# Patient Record
Sex: MALE | Race: WHITE | NOT HISPANIC OR LATINO | Employment: FULL TIME | URBAN - METROPOLITAN AREA
[De-identification: names, ages, dates, MRNs, and addresses within clinical notes are randomized per-mention and may not be internally consistent; named-entity substitution may affect disease eponyms.]

---

## 2017-01-12 ENCOUNTER — OFFICE VISIT (OUTPATIENT)
Dept: FAMILY MEDICINE | Facility: CLINIC | Age: 32
End: 2017-01-12
Payer: OTHER MISCELLANEOUS

## 2017-01-12 ENCOUNTER — HOSPITAL ENCOUNTER (EMERGENCY)
Facility: CLINIC | Age: 32
Discharge: HOME | End: 2017-01-12
Attending: FAMILY MEDICINE | Admitting: FAMILY MEDICINE
Payer: OTHER MISCELLANEOUS

## 2017-01-12 ENCOUNTER — APPOINTMENT (OUTPATIENT)
Dept: GENERAL RADIOLOGY | Facility: CLINIC | Age: 32
End: 2017-01-12
Attending: EMERGENCY MEDICINE
Payer: OTHER MISCELLANEOUS

## 2017-01-12 VITALS
DIASTOLIC BLOOD PRESSURE: 78 MMHG | BODY MASS INDEX: 26.28 KG/M2 | OXYGEN SATURATION: 100 % | HEART RATE: 53 BPM | SYSTOLIC BLOOD PRESSURE: 118 MMHG | WEIGHT: 194 LBS | HEIGHT: 72 IN

## 2017-01-12 VITALS — DIASTOLIC BLOOD PRESSURE: 82 MMHG | TEMPERATURE: 98.1 F | SYSTOLIC BLOOD PRESSURE: 112 MMHG | OXYGEN SATURATION: 98 %

## 2017-01-12 DIAGNOSIS — S61.219A FINGER LACERATION, INITIAL ENCOUNTER: Primary | ICD-10-CM

## 2017-01-12 DIAGNOSIS — S61.219A FINGER LACERATION, INITIAL ENCOUNTER: ICD-10-CM

## 2017-01-12 PROCEDURE — 73140 X-RAY EXAM OF FINGER(S): CPT | Mod: LT

## 2017-01-12 PROCEDURE — 99213 OFFICE O/P EST LOW 20 MIN: CPT | Performed by: PHYSICIAN ASSISTANT

## 2017-01-12 PROCEDURE — 99283 EMERGENCY DEPT VISIT LOW MDM: CPT | Mod: 25 | Performed by: FAMILY MEDICINE

## 2017-01-12 PROCEDURE — 99283 EMERGENCY DEPT VISIT LOW MDM: CPT | Mod: 25

## 2017-01-12 PROCEDURE — 12041 INTMD RPR N-HF/GENIT 2.5CM/<: CPT | Performed by: FAMILY MEDICINE

## 2017-01-12 PROCEDURE — 12041 INTMD RPR N-HF/GENIT 2.5CM/<: CPT

## 2017-01-12 RX ORDER — TRAMADOL HYDROCHLORIDE 50 MG/1
50-100 TABLET ORAL EVERY 6 HOURS PRN
Qty: 10 TABLET | Refills: 0 | Status: SHIPPED | OUTPATIENT
Start: 2017-01-12 | End: 2020-12-16

## 2017-01-12 RX ORDER — CEPHALEXIN 500 MG/1
500 CAPSULE ORAL 3 TIMES DAILY
Qty: 9 CAPSULE | Refills: 0 | Status: SHIPPED | OUTPATIENT
Start: 2017-01-12 | End: 2017-01-15

## 2017-01-12 NOTE — PROGRESS NOTES
SUBJECTIVE:                                                    Robert Shoemaker is a 31 year old male who presents to clinic today for the following health issues:    Laceration      Duration: within an hour    Description (location/character/radiation): left pointer finger - was cut on a steel std at work     Intensity: moderate/severe    Accompanying signs and symptoms: still actively bleeding    History (similar episodes/previous evaluation): None    Precipitating or alleviating factors: None    Therapies tried and outcome: None   No numbness/tingling     Problem list and histories reviewed & adjusted, as indicated.  Additional history: as documented    There is no problem list on file for this patient.    History reviewed. No pertinent past surgical history.    Social History   Substance Use Topics     Smoking status: Current Every Day Smoker     Smokeless tobacco: Not on file     Alcohol Use: Yes     Family History   Problem Relation Age of Onset     Hypertension Mother          No current outpatient prescriptions on file.     No Known Allergies  Problem list, Medication list, Allergies, and Medical/Social/Surgical histories reviewed in EPIC and updated as appropriate.    ROS:  Constitutional, HEENT, cardiovascular, pulmonary, gi and gu systems are negative, except as otherwise noted.    OBJECTIVE:                                                    /78 mmHg  Pulse 53  Ht 6' (1.829 m)  Wt 194 lb (87.998 kg)  BMI 26.31 kg/m2  SpO2 100%  Body mass index is 26.31 kg/(m^2).  GENERAL: healthy, alert and no distress  Left Index finger: U - flap shape laceration over proximal inter-phalangeal joint. Appears to be in joint. Hard to tell if tendon involved. Pain with resisted finger extension. 5/5 strength. Neurovascularly Intact Distally. .     ASSESSMENT/PLAN:                                                        ICD-10-CM    1. Finger laceration, initial encounter S61.219A       Lidocaine was used prior to  exploring wound. This is too deep and into the joint for me to repair.   Tube gauze dressing applied.elevate.  Advised follow up  In ED.     Mckay Roman PA-C  [facility]

## 2017-01-12 NOTE — ED AVS SNAPSHOT
[facility] Emergency Department    [address] [address]    Phone:  [phone]    Fax:  [phone]                                       Robert Shoemaker   MRN: [id number]    Department:  [facility] Emergency Department   Date of Visit:  1/12/2017           Patient Information     Date Of Birth          1985        Your diagnoses for this visit were:     Finger laceration, initial encounter        You were seen by Lemuel Davey MD.        Discharge Instructions       Return to the Emergency Room if the following occurs:     Worsened pain, expanding redness and swelling, fever, or for any concern at anytime.    Or, follow-up with the following provider as we discussed:     Return to [facility] next week for reevaluation.  Call [phone] for reevaluation.    Medications discussed:    Antibiotic ointment twice daily until stitches are removed.  Keflex.  Ibuprofen 600mg every 6 hours for pain (7 days duration).  Tylenol 1000mg every 6 hours for pain (7 days duration).  Splint on over the next 3 days.    If you received pain-relieving or sedating medication during your time in the ER, avoid alcohol, driving automobiles, or working with machinery.  Also, a responsible adult must stay with you.      If you had X-rays or labs done we will attempt to contact you if there is a change needed in your care.      Call the Nurse Advice Line at [phone] or [phone] for any concern at anytime.      24 Hour Appointment Hotline       To make an appointment at any [place] clinic, call [phone] ([phone]). If you don't have a family doctor or clinic, we will help you find one. [place] clinics are conveniently located to serve the needs of you and your family.             Review of your medicines      START taking        Dose / Directions Last dose taken    cephALEXin 500 MG capsule   Commonly known as:  KEFLEX   Dose:  500 mg   Quantity:  9 capsule         "Take 1 capsule (500 mg) by mouth 3 times daily for 3 days   Refills:  0                Prescriptions were sent or printed at these locations (1 Prescription)                   [facility] [place] - [address]   [address] [address]    Telephone:  [phone]   Fax:  [phone]   Hours:                  E-Prescribed (1 of 1)         cephALEXin (KEFLEX) 500 MG capsule                Procedures and tests performed during your visit     Fingers XR, 2-3 views, left      Orders Needing Specimen Collection     None      Pending Results     No orders found from 1/11/2017 to 1/13/2017.            Pending Culture Results     No orders found from 1/11/2017 to 1/13/2017.       Test Results from your hospital stay           1/12/2017  4:08 PM - Interface, Radiant Ib      Narrative     XR FINGER LT G/E 2 VW   1/12/2017 3:56 PM     HISTORY: pain    COMPARISON: None.        Impression     IMPRESSION: No evidence of acute fracture or subluxation. Bandage  material overlies the index finger. On the lateral view there is a  radiopacity dorsal and proximal to the PIP joint may be something  overlying the patient or a small foreign body.    WAYNE BARBOZA MD                Thank you for choosing [facility]       Thank you for choosing [facility] for your care. Our goal is always to provide you with excellent care. Hearing back from our patients is one way we can continue to improve our services. Please take a few minutes to complete the written survey that you may receive in the mail after you visit with us. Thank you!        [facility] Information     [employer] lets you send messages to your doctor, view your test results, renew your prescriptions, schedule appointments and more. To sign up, go to [email or web address] . Click on \"Log in\" on the left side of the screen, which will take you to the Welcome page. Then click on \"Sign up Now\" on the right side of the page.     You will be asked to " enter the access code listed below, as well as some personal information. Please follow the directions to create your username and password.     Your access code is: [email or web address]  Expires: 2017  2:44 PM     Your access code will  in 90 days. If you need help or a new code, please call your [place] clinic or [phone].        Care EveryWhere ID     This is your Care EveryWhere ID. This could be used by other organizations to access your [place] medical records  [id number]        After Visit Summary       This is your record. Keep this with you and show to your community pharmacist(s) and doctor(s) at your next visit.

## 2017-01-12 NOTE — ED AVS SNAPSHOT
[facility] Emergency Department    [address] [address]    Phone:  [phone]    Fax:  [phone]                                       Robert Shoemaker   MRN: [id number]    Department:  [facility] Emergency Department   Date of Visit:  1/12/2017           After Visit Summary Signature Page     I have received my discharge instructions, and my questions have been answered. I have discussed any challenges I see with this plan with the nurse or doctor.    ..........................................................................................................................................  Patient/Patient Representative Signature      ..........................................................................................................................................  Patient Representative Print Name and Relationship to Patient    ..................................................               ................................................  Date                                            Time    ..........................................................................................................................................  Reviewed by Signature/Title    ...................................................              ..............................................  Date                                                            Time

## 2017-01-12 NOTE — MR AVS SNAPSHOT
"              After Visit Summary   2017    Robert Shoemaker    MRN: [id number]           Patient Information     Date Of Birth          1985        Visit Information        Provider Department      2017 1:50 PM Mckay Roman PA-C [facility] [place]         Follow-ups after your visit        Who to contact     If you have questions or need follow up information about today's clinic visit or your schedule please contact [facility] directly at [phone].  Normal or non-critical lab and imaging results will be communicated to you by MyChart, letter or phone within 4 business days after the clinic has received the results. If you do not hear from us within 7 days, please contact the clinic through [facility] or phone. If you have a critical or abnormal lab result, we will notify you by phone as soon as possible.  Submit refill requests through [facility] or call your pharmacy and they will forward the refill request to us. Please allow 3 business days for your refill to be completed.          Additional Information About Your Visit        [facility] Information     [facility] lets you send messages to your doctor, view your test results, renew your prescriptions, schedule appointments and more. To sign up, go to [email or web address] . Click on \"Log in\" on the left side of the screen, which will take you to the Welcome page. Then click on \"Sign up Now\" on the right side of the page.     You will be asked to enter the access code listed below, as well as some personal information. Please follow the directions to create your username and password.     Your access code is: [email or web address]  Expires: 2017  2:44 PM     Your access code will  in 90 days. If you need help or a new code, please call your [facility] or [phone].        Care EveryWhere ID     This is your Care EveryWhere ID. This could be used by other organizations to access your [place] medical " records  [id number]        Your Vitals Were     Pulse Height BMI (Body Mass Index) Pulse Oximetry          53 6' (1.829 m) 26.31 kg/m2 100%         Blood Pressure from Last 3 Encounters:   01/12/17 118/78    Weight from Last 3 Encounters:   01/12/17 194 lb (87.998 kg)              Today, you had the following     No orders found for display       Primary Care Provider Office Phone # Fax #    [facility] [phone] [address] [facility] [address]        Thank you!     Thank you for choosing [facility]  for your care. Our goal is always to provide you with excellent care. Hearing back from our patients is one way we can continue to improve our services. Please take a few minutes to complete the written survey that you may receive in the mail after your visit with us. Thank you!             Your Updated Medication List - Protect others around you: Learn how to safely use, store and throw away your medicines at www.disposemymeds.org.      Notice  As of 1/12/2017  2:44 PM    You have not been prescribed any medications.

## 2017-01-13 NOTE — ED NOTES
Patient seen his doctor today and was told to come to the ER for further evaluation of the laceration on his finger.

## 2017-01-13 NOTE — DISCHARGE INSTRUCTIONS
Return to the Emergency Room if the following occurs:     Worsened pain, expanding redness and swelling, fever, or for any concern at anytime.    Or, follow-up with the following provider as we discussed:     Return to [facility] next week for reevaluation.  Call [phone] for reevaluation.    Medications discussed:    Antibiotic ointment twice daily until stitches are removed.  Keflex.  Ibuprofen 600mg every 6 hours for pain (7 days duration).  Tylenol 1000mg every 6 hours for pain (7 days duration).  Splint on over the next 3 days.    If you received pain-relieving or sedating medication during your time in the ER, avoid alcohol, driving automobiles, or working with machinery.  Also, a responsible adult must stay with you.      If you had X-rays or labs done we will attempt to contact you if there is a change needed in your care.      Call the Nurse Advice Line at [phone] or [phone] for any concern at anytime.

## 2017-01-13 NOTE — ED PROVIDER NOTES
HPI  Patient is a [age]-year-old male presenting with laceration to his left second finger.  No prior injury to this finger is reported.  This occurred just prior to arrival.  This was work related.  He was seen in a clinic before coming here.  He tells me there was some local anesthesia given no imaging performed.  They were concerned about a tendon injury and suggested the patient come here for further evaluation.  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  He denies loss of strength with extension or flexion.  No numbness described.  No other injury.    ROS: All other review of systems are negative other than that noted above.    PMH: Reviewed.  SH: Reviewed.  FH: Reviewed.      PHYSICAL  /75 mmHg  Temp(Src) 98.1  F (36.7  C) (Oral)  SpO2 98%  General: Patient is alert and in moderate distress.  Neurological: Alert.  Moving upper and lower extremities equally, bilaterally.  Head / Neck: Atraumatic.  Ears: Not done.  Eyes: Pupils are equal, round, and reactive.  Normal conjunctiva.  Nose: Midline.  No epistaxis.  Mouth / Throat:  Moist. Respiratory: No respiratory distress.  Cardiovascular: Regular rhythm.  Peripheral extremities are warm.    Abdomen / Pelvis: Not done. Genitalia: Not done.  Musculoskeletal: Nontender to palpation over major muscles and joints.  Skin: There is a 2.5 centimeter curvilinear laceration involving his second knuckle, overlying the PIP joint.  There is active bleeding.  It extends about skilled nursing into the soft tissue overlying the knuckle.  There is a small oval disc that is firm and attached to soft tissue.  I think this may be a portion of the tendon sheath.  He has normal strength with extension and flexion.  I cannot identify an obvious tendon injury.      PHYSICIAN  1820.  Patient agrees to local anesthesia for exploration.  My exam is documented above.  Imaging is completed and shows a potential foreign body, as described  by radiology.  I'm unable to identify a foreign body on exam.  The wound will be flushed with 500 mL normal saline.    IMAGING  XR finger.  Images reviewed by me.  Radiology report was also reviewed.      1952.  Upon further examination, I am able to see that the tendon sheath was compromised by the laceration.  The disc material is likely a sami of bone as it is attached to small part of the tendon sheath.  I am able to see the extensor tendon but not in its entirety.  It is moving with active extension.  The overlying laceration is closed.  I did not place a suture within the tendon sheath.    PROCEDURE  Laceration closure.  Local anesthesia with lidocaine and epinephrine.  Wound irrigation with 500 mL normal saline.  Exploration, as above.  I then sutured the wound closed using #6 simple interrupted stitches, 4-0 Ethilon suture.      IMPRESSION    ICD-10-CM    1. Finger laceration, initial encounter S61.219A              Lemuel Davey MD  01/12/17 1954

## 2020-12-16 ENCOUNTER — OFFICE VISIT (OUTPATIENT)
Dept: DERMATOLOGY | Facility: CLINIC | Age: 35
End: 2020-12-16
Payer: COMMERCIAL

## 2020-12-16 DIAGNOSIS — L40.0 PLAQUE PSORIASIS: Primary | ICD-10-CM

## 2020-12-16 DIAGNOSIS — Z20.822 EXPOSURE TO COVID-19 VIRUS: ICD-10-CM

## 2020-12-16 DIAGNOSIS — L40.0 PLAQUE PSORIASIS: ICD-10-CM

## 2020-12-16 LAB
ALBUMIN SERPL-MCNC: 4.1 G/DL (ref 3.4–5)
ALP SERPL-CCNC: 100 U/L (ref 40–150)
ALT SERPL W P-5'-P-CCNC: 56 U/L (ref 0–70)
ANION GAP SERPL CALCULATED.3IONS-SCNC: 6 MMOL/L (ref 3–14)
AST SERPL W P-5'-P-CCNC: 36 U/L (ref 0–45)
BASOPHILS # BLD AUTO: 0 10E9/L (ref 0–0.2)
BASOPHILS NFR BLD AUTO: 0.6 %
BILIRUB SERPL-MCNC: 0.5 MG/DL (ref 0.2–1.3)
BUN SERPL-MCNC: 15 MG/DL (ref 7–30)
CALCIUM SERPL-MCNC: 9.1 MG/DL (ref 8.5–10.1)
CHLORIDE SERPL-SCNC: 107 MMOL/L (ref 94–109)
CO2 SERPL-SCNC: 27 MMOL/L (ref 20–32)
CREAT SERPL-MCNC: 0.94 MG/DL (ref 0.66–1.25)
DIFFERENTIAL METHOD BLD: NORMAL
EOSINOPHIL # BLD AUTO: 0.1 10E9/L (ref 0–0.7)
EOSINOPHIL NFR BLD AUTO: 1.3 %
ERYTHROCYTE [DISTWIDTH] IN BLOOD BY AUTOMATED COUNT: 11.6 % (ref 10–15)
GFR SERPL CREATININE-BSD FRML MDRD: >90 ML/MIN/{1.73_M2}
GLUCOSE SERPL-MCNC: 98 MG/DL (ref 70–99)
HCT VFR BLD AUTO: 47.1 % (ref 40–53)
HGB BLD-MCNC: 16.2 G/DL (ref 13.3–17.7)
IMM GRANULOCYTES # BLD: 0.2 10E9/L (ref 0–0.4)
IMM GRANULOCYTES NFR BLD: 2.3 %
LYMPHOCYTES # BLD AUTO: 1.7 10E9/L (ref 0.8–5.3)
LYMPHOCYTES NFR BLD AUTO: 23.7 %
MCH RBC QN AUTO: 31.6 PG (ref 26.5–33)
MCHC RBC AUTO-ENTMCNC: 34.4 G/DL (ref 31.5–36.5)
MCV RBC AUTO: 92 FL (ref 78–100)
MONOCYTES # BLD AUTO: 0.7 10E9/L (ref 0–1.3)
MONOCYTES NFR BLD AUTO: 10.3 %
NEUTROPHILS # BLD AUTO: 4.3 10E9/L (ref 1.6–8.3)
NEUTROPHILS NFR BLD AUTO: 61.8 %
NRBC # BLD AUTO: 0 10*3/UL
NRBC BLD AUTO-RTO: 0 /100
PLATELET # BLD AUTO: 180 10E9/L (ref 150–450)
POTASSIUM SERPL-SCNC: 4.3 MMOL/L (ref 3.4–5.3)
PROT SERPL-MCNC: 8.3 G/DL (ref 6.8–8.8)
RBC # BLD AUTO: 5.12 10E12/L (ref 4.4–5.9)
SODIUM SERPL-SCNC: 140 MMOL/L (ref 133–144)
WBC # BLD AUTO: 7 10E9/L (ref 4–11)

## 2020-12-16 PROCEDURE — 86803 HEPATITIS C AB TEST: CPT | Mod: 90 | Performed by: PATHOLOGY

## 2020-12-16 PROCEDURE — 80053 COMPREHEN METABOLIC PANEL: CPT | Performed by: PATHOLOGY

## 2020-12-16 PROCEDURE — 86769 SARS-COV-2 COVID-19 ANTIBODY: CPT | Performed by: PATHOLOGY

## 2020-12-16 PROCEDURE — 87340 HEPATITIS B SURFACE AG IA: CPT | Mod: 90 | Performed by: PATHOLOGY

## 2020-12-16 PROCEDURE — 86481 TB AG RESPONSE T-CELL SUSP: CPT | Mod: 90 | Performed by: PATHOLOGY

## 2020-12-16 PROCEDURE — 36415 COLL VENOUS BLD VENIPUNCTURE: CPT | Performed by: PATHOLOGY

## 2020-12-16 PROCEDURE — 99203 OFFICE O/P NEW LOW 30 MIN: CPT | Performed by: PHYSICIAN ASSISTANT

## 2020-12-16 PROCEDURE — 86706 HEP B SURFACE ANTIBODY: CPT | Mod: 90 | Performed by: PATHOLOGY

## 2020-12-16 PROCEDURE — 86704 HEP B CORE ANTIBODY TOTAL: CPT | Mod: 90 | Performed by: PATHOLOGY

## 2020-12-16 PROCEDURE — 85025 COMPLETE CBC W/AUTO DIFF WBC: CPT | Performed by: PATHOLOGY

## 2020-12-16 PROCEDURE — 87389 HIV-1 AG W/HIV-1&-2 AB AG IA: CPT | Mod: 90 | Performed by: PATHOLOGY

## 2020-12-16 ASSESSMENT — PAIN SCALES - GENERAL: PAINLEVEL: MODERATE PAIN (4)

## 2020-12-16 NOTE — LETTER
12/16/2020       RE: Robert Shoemaker  [address] [address]     Dear Colleague,    Thank you for referring your patient, Robert Shoemaker, to the [facility] [place] [facility]. Please see a copy of my visit note below.    [facility] Dermatology Note      Dermatology Problem List:  1.Plaque psoriasis in a patient with ulcerative colitis  Start Stelara 45 mg week zero, 4 and every 12 weeks.   Punch biopsy 9/23/19 consistent with psoriasis       CC:   Chief Complaint   Patient presents with     Derm Problem     Patient is here today for psoriasis.          Encounter Date: Dec 16, 2020    History of Present Illness:  Mr. Robert Shoemaker is a 35 year old male who presents in self referral for psoriasis. He is here as a new patient. He was seen by Dr Nettles 12/2019 and attempted to start on Stelara but never received the medication. He first noticed his psoriasis the summer of 2019 and got one spot on his abdomen. Gradually it spread to his face, scalp, elbows and knees and behind his ears. It affects his genitalia and his butt. Within the last 2 months he has noticed it spreading on to his fingers. It is very bothersome.     He started a [employer] company 3 years ago and admits it is very stressful work.     He has history of Ulcerative colitis, diagnosed with colonoscopy and biopsy. He had been followed at a private GI speciality clinic, he believes [facility] but has not gone back to them for several years. He typically has regular abdominal pain and diarrhea at least 3 times per day. He went on steroids in the past and it cleared.     He is interested in a medication that may treat both his UC and his plaque psoriasis. He denies any joint pain at this time or nail changes.     He lives with and helps take care of his elderly grandfather. He states his grandfather was hospitalize twice for COVID complications within the  last month. Robert wonders if he ever was positive as he never had any symptoms of COVID -19.     HE is feeling well, otherwise, without other skin concerns.     Past Medical History:   There is no problem list on file for this patient.    No past medical history on file.  No past surgical history on file.    Social History:  Patient reports that he has quit smoking. He uses smokeless tobacco. He reports current alcohol use. He reports that he does not use drugs. is single. Cares for his grandfather.   Family History:  Family History   Problem Relation Age of Onset     Hypertension Mother    No family history of skin diseases.     Medications:  Current Outpatient Medications   Medication Sig Dispense Refill     traMADol (ULTRAM) 50 MG tablet Take 1-2 tablets ( mg) by mouth every 6 hours as needed for pain (Patient not taking: Reported on 12/16/2020) 10 tablet 0     No Known Allergies      Review of Systems:  -Skin/Heme New Pt: The patient admits to frequent sun exposure. The patient denies excessive scarring or problems healing except as per HPI. The patient denies excessive bleeding.  -Constitutional: Otherwise feeling well today, in usual state of health.  -Skin: As above in HPI. No additional skin concerns.  -ROS: see HPI, + diarrhea+ abdominal pain. Denies arthalgias, myalgias, fever, cough, congestion, loss of taste or smell.     Physical exam:  Vitals: There were no vitals taken for this visit.  GEN: This is a well developed, well-nourished male in no acute distress, in a pleasant mood.    SKIN: Full skin, which includes the head/face, both arms, chest, back, abdomen,both legs, genitalia and/or groin buttocks, digits and/or nails, was examined.  -[place]a skin type: II  -There are erythematous well demarcated plaques with silvery micaceous scale on the scalp, post auricular areas, face, dorsal hands, intergluteal cleft and large plaques on the elbows, knees and abdomen.   -No other lesions of concern on  areas examined.     Labs:  CBC with diff, CMP, Hep C antibody, Hep B core and Surface antibody, Hepatitis B antigen, HIV, Tuberculosis Quantiferon.  Covid Antibodies    Impression/Plan:  1. Plaque Psoriasis, in a patient with concomitant ulcerative colitis. Will want to use a systemic mediation that will treat both diseases. (TNF inhibitor Humira or Remicade are other options) at least 10 % BSA with most of scalp affected and his genitals.   -Discussed biologics,  He has been on topical medications for many years.. It has become more wide spread and involving the genitals and nails and scalp.   Will order CBC with diff, CMP, Hep C antibody, Hep B core and Surface antibody, Hepatitis B antigen, HIV, Tuberculosis Quantiferon.  If all within normal limits, we will start Stelara 45 mg at week zero, 4 and then every 12 weeks.   Discussed risks and benefits of the medication. Pt understands that he is at an increased risk for infections and malignancies such as lymphoma. Will notify the office if any change in health.     2. Exposure to Covid in a person in his home,   -Will order COVID antibodies.      CC Referred Self, MD  No address on file on close of this encounter.  Follow-up in 3 months, earlier for new or changing lesions.       Staff Involved:  Staff Only    All risks, benefits and alternatives were discussed with patient.  Patient is in agreement and understands the assessment and plan.  All questions were answered.  Sun Screen Education was given.   Return to Clinic in 3 months or sooner as needed.   Estephania Rocha PA-C

## 2020-12-16 NOTE — NURSING NOTE
Chief Complaint   Patient presents with     Derm Problem     Patient is here today for psoriasis.      Charmaine LA CMA

## 2020-12-16 NOTE — PROGRESS NOTES
[facility] Dermatology Note      Dermatology Problem List:  1.Plaque psoriasis in a patient with ulcerative colitis  Start Stelara 45 mg week zero, 4 and every 12 weeks.   Punch biopsy 9/23/19 consistent with psoriasis       CC:   Chief Complaint   Patient presents with     Derm Problem     Patient is here today for psoriasis.          Encounter Date: Dec 16, 2020    History of Present Illness:  Mr. Robert Shoemaker is a 35 year old male who presents in self referral for psoriasis. He is here as a new patient. He was seen by Dr Nettles 12/2019 and attempted to start on Stelara but never received the medication. He first noticed his psoriasis the summer of 2019 and got one spot on his abdomen. Gradually it spread to his face, scalp, elbows and knees and behind his ears. It affects his genitalia and his butt. Within the last 2 months he has noticed it spreading on to his fingers. It is very bothersome.     He started a [employer] company 3 years ago and admits it is very stressful work.     He has history of Ulcerative colitis, diagnosed with colonoscopy and biopsy. He had been followed at a private GI speciality clinic, he believes [facility] but has not gone back to them for several years. He typically has regular abdominal pain and diarrhea at least 3 times per day. He went on steroids in the past and it cleared.     He is interested in a medication that may treat both his UC and his plaque psoriasis. He denies any joint pain at this time or nail changes.     He lives with and helps take care of his elderly grandfather. He states his grandfather was hospitalize twice for COVID complications within the last month. Robert wonders if he ever was positive as he never had any symptoms of COVID -19.     HE is feeling well, otherwise, without other skin concerns.     Past Medical History:   There is no problem list on file for this patient.    No past medical history on file.  No past surgical history on  file.    Social History:  Patient reports that he has quit smoking. He uses smokeless tobacco. He reports current alcohol use. He reports that he does not use drugs. is single. Cares for his grandfather.   Family History:  Family History   Problem Relation Age of Onset     Hypertension Mother    No family history of skin diseases.     Medications:  Current Outpatient Medications   Medication Sig Dispense Refill     traMADol (ULTRAM) 50 MG tablet Take 1-2 tablets ( mg) by mouth every 6 hours as needed for pain (Patient not taking: Reported on 12/16/2020) 10 tablet 0     No Known Allergies      Review of Systems:  -Skin/Heme New Pt: The patient admits to frequent sun exposure. The patient denies excessive scarring or problems healing except as per HPI. The patient denies excessive bleeding.  -Constitutional: Otherwise feeling well today, in usual state of health.  -Skin: As above in HPI. No additional skin concerns.  -ROS: see HPI, + diarrhea+ abdominal pain. Denies arthalgias, myalgias, fever, cough, congestion, loss of taste or smell.     Physical exam:  Vitals: There were no vitals taken for this visit.  GEN: This is a well developed, well-nourished male in no acute distress, in a pleasant mood.    SKIN: Full skin, which includes the head/face, both arms, chest, back, abdomen,both legs, genitalia and/or groin buttocks, digits and/or nails, was examined.  -Olvera skin type: II  -There are erythematous well demarcated plaques with silvery micaceous scale on the scalp, post auricular areas, face, dorsal hands, intergluteal cleft and large plaques on the elbows, knees and abdomen.   -No other lesions of concern on areas examined.     Labs:  CBC with diff, CMP, Hep C antibody, Hep B core and Surface antibody, Hepatitis B antigen, HIV, Tuberculosis Quantiferon.  Covid Antibodies    Impression/Plan:  1. Plaque Psoriasis, in a patient with concomitant ulcerative colitis. Will want to use a systemic mediation  that will treat both diseases. (TNF inhibitor Humira or Remicade are other options) at least 10 % BSA with most of scalp affected and his genitals.   -Discussed biologics,  He has been on topical medications for many years.. It has become more wide spread and involving the genitals and nails and scalp.   Will order CBC with diff, CMP, Hep C antibody, Hep B core and Surface antibody, Hepatitis B antigen, HIV, Tuberculosis Quantiferon.  If all within normal limits, we will start Stelara 45 mg at week zero, 4 and then every 12 weeks.   Discussed risks and benefits of the medication. Pt understands that he is at an increased risk for infections and malignancies such as lymphoma. Will notify the office if any change in health.     2. Exposure to Covid in a person in his home,   -Will order COVID antibodies.      CC Referred Self, MD  No address on file on close of this encounter.  Follow-up in 3 months, earlier for new or changing lesions.       Staff Involved:  Staff Only    All risks, benefits and alternatives were discussed with patient.  Patient is in agreement and understands the assessment and plan.  All questions were answered.  Sun Screen Education was given.   Return to Clinic in 3 months or sooner as needed.   Estephania Rocha PA-C

## 2020-12-17 LAB
HBV CORE AB SERPL QL IA: NONREACTIVE
HBV SURFACE AB SERPL IA-ACNC: 0.21 M[IU]/ML
HBV SURFACE AG SERPL QL IA: NONREACTIVE
HCV AB SERPL QL IA: NONREACTIVE
HIV 1+2 AB+HIV1 P24 AG SERPL QL IA: NONREACTIVE

## 2020-12-18 LAB
COVID-19 SPIKE RBD ABY TITER: NORMAL
COVID-19 SPIKE RBD ABY: POSITIVE

## 2020-12-20 ENCOUNTER — HEALTH MAINTENANCE LETTER (OUTPATIENT)
Age: 35
End: 2020-12-20

## 2020-12-21 LAB
GAMMA INTERFERON BACKGROUND BLD IA-ACNC: 0.09 IU/ML
M TB IFN-G CD4+ BCKGRND COR BLD-ACNC: 9.91 IU/ML
M TB TUBERC IFN-G BLD QL: NEGATIVE
MITOGEN IGNF BCKGRD COR BLD-ACNC: 0 IU/ML
MITOGEN IGNF BCKGRD COR BLD-ACNC: 0.02 IU/ML

## 2020-12-22 ENCOUNTER — TELEPHONE (OUTPATIENT)
Dept: DERMATOLOGY | Facility: CLINIC | Age: 35
End: 2020-12-22

## 2020-12-22 DIAGNOSIS — L40.0 PLAQUE PSORIASIS: Primary | ICD-10-CM

## 2020-12-22 RX ORDER — USTEKINUMAB 45 MG/.5ML
45 INJECTION, SOLUTION SUBCUTANEOUS ONCE
Qty: 0.5 ML | Refills: 0 | Status: SHIPPED | OUTPATIENT
Start: 2020-12-22 | End: 2020-12-22

## 2020-12-22 NOTE — TELEPHONE ENCOUNTER
PA Initiation    Medication: Stelara-Pending  Insurance Company: Other (see comments)Comment:  [facility] [phone]  Pharmacy Filling the Rx: [facility] - [address] [place] [address]  Filling Pharmacy Phone:    Filling Pharmacy Fax:    Start Date: 12/22/2020      PA for loading dose and maintenance dose initiated via phone, plan will fax 2 PA forms to complete, one of loading dose and one for maintenance dose.

## 2020-12-29 NOTE — TELEPHONE ENCOUNTER
PRIOR AUTHORIZATION DENIED    Medication: Stelara-Denied    Denial Date: 12/24/2020    Denial Rational:     Appeal Information:

## 2021-01-15 ENCOUNTER — MYC MEDICAL ADVICE (OUTPATIENT)
Dept: DERMATOLOGY | Facility: CLINIC | Age: 36
End: 2021-01-15

## 2021-01-18 NOTE — TELEPHONE ENCOUNTER
AppeaL:    Mr Shoemaker has significant plaque psoriasis affecting the face ,trunk, extremities, genitlas and most of the scalp. He has concurrent ulcerative colitis that needs treatment. He needs a systemic treatment to treat both diseases. Phototherapy would not be effective for the scalp.  He defers topical steroids as he has used these in the past without improvement. The psoriasis has become too widespread to use topical medications. Please consider covering this medication or Humira has this also treats Ulcerative colitis.     Thank you for your consideration,  Estephania Rocha PA-C

## 2021-01-22 NOTE — TELEPHONE ENCOUNTER
Medication Appeal Initiation    We have initiated an appeal for the requested medication:  Medication: Stelara - Appeal Pending  Appeal Start Date:  1/22/2021  Insurance Company:    Comments:   Faxed appeal to the insurance.

## 2021-02-08 NOTE — TELEPHONE ENCOUNTER
MEDICATION APPEAL DENIED    Medication: Stelara - Appeal Denied    Denial Date:   02/08/2021    Denial Rational: Partial denial - 0.5ml for 1 fill then 0.5ml every 84 days for 5 months    Second Level Appeal Information: This is a partial denial.

## 2021-02-11 NOTE — TELEPHONE ENCOUNTER
Tiago Yepez-  Do I need to re-send the script anywhere or is he able to get it delivered? I guess partial is not bad! Thanks for the help!  Estephania Rocha PA-C

## 2021-02-12 NOTE — TELEPHONE ENCOUNTER
Left a message for patient to reach out to his GI physicians to see if they can prescribe stronger doses of biologics for his ulcerative colitis. For now, will attempt to get humira covered for patient, psoriasis dosing 80 mg x 1 then 40 mg every 2 weeks (CF pens)

## 2021-02-16 ENCOUNTER — TELEPHONE (OUTPATIENT)
Dept: DERMATOLOGY | Facility: CLINIC | Age: 36
End: 2021-02-16

## 2021-02-16 NOTE — TELEPHONE ENCOUNTER
[facility] Call Center    Phone Message    May a detailed message be left on voicemail: yes     Reason for Call: Medication Question or concern regarding medication   Prescription Clarification  Name of Medication: adalimumab (HUMIRA *CF*) 80 MG/0.8ML pen kit  Prescribing Provider: Estephania Rocha   [facility]   What on the order needs clarification? The pharmacist needs to [id number] the dosing instructions. Please call to discuss. Thanks.          Action Taken: Message routed to:  [facility]    Travel Screening: Not Applicable

## 2021-02-16 NOTE — TELEPHONE ENCOUNTER
"adalimumab (HUMIRA *CF*) 80 MG/0.8ML pen kit 0.8 mL 0 2/12/2021  --   Sig - Route: Inject 0.8 mLs (80 mg) Subcutaneous See Admin Instructions for 1 dose - Subcutaneous     Spoke with pharm and had them change it to \"Inject 0.8 mLs (80 mg) Subcutaneous See Instructions in package for the first dose\"    Annetta Guerrero CMA   "

## 2021-02-23 ENCOUNTER — TELEPHONE (OUTPATIENT)
Dept: DERMATOLOGY | Facility: CLINIC | Age: 36
End: 2021-02-23

## 2021-02-23 NOTE — TELEPHONE ENCOUNTER
PA Initiation    Medication: Humira - Pending  Insurance Company:    Pharmacy Filling the Rx:    Filling Pharmacy Phone:    Filling Pharmacy Fax:    Start Date: 2/23/2021

## 2021-02-24 NOTE — TELEPHONE ENCOUNTER
Prior Authorization Approval    Authorization Effective Date: 2/24/2021  Authorization Expiration Date: 3/23/2021  Medication: Humira - APPROVED   Approved Dose/Quantity:  Starter   Reference #:     Insurance Company: Other (see comments)  Expected CoPay:       CoPay Card Available:      Foundation Assistance Needed:    Which Pharmacy is filling the prescription (Not needed for infusion/clinic administered): [facility] [address]  Pharmacy Notified: Yes  Patient Notified: Yes

## 2021-02-25 ENCOUNTER — MYC MEDICAL ADVICE (OUTPATIENT)
Dept: DERMATOLOGY | Facility: CLINIC | Age: 36
End: 2021-02-25

## 2021-03-15 ENCOUNTER — VIRTUAL VISIT (OUTPATIENT)
Dept: DERMATOLOGY | Facility: CLINIC | Age: 36
End: 2021-03-15
Payer: COMMERCIAL

## 2021-03-15 DIAGNOSIS — K51.90 CHRONIC ULCERATIVE COLITIS WITHOUT COMPLICATION, UNSPECIFIED LOCATION (H): Primary | ICD-10-CM

## 2021-03-15 DIAGNOSIS — L40.0 PLAQUE PSORIASIS: ICD-10-CM

## 2021-03-15 PROCEDURE — 99214 OFFICE O/P EST MOD 30 MIN: CPT | Mod: 95 | Performed by: PHYSICIAN ASSISTANT

## 2021-03-15 ASSESSMENT — PAIN SCALES - GENERAL: PAINLEVEL: NO PAIN (0)

## 2021-03-15 NOTE — LETTER
3/15/2021       RE: Robert Shoemaker  [id number] [facility] [address]     Dear Colleague,    Thank you for referring your patient, Robert Shoemaker, to the [facility] [place] at [facility]. Please see a copy of my visit note below.    [facility] Dermatology Note  Encounter Date: Mar 15, 2021  Store-and-Forward and Telephone ([phone]). Location of teledermatologist: [facility] [place].  Start time: 10:51 End time: 10:59 am.    Dermatology Problem List:  1. Plaque psoriasis in a patient with ulcerative colitis  Humira CF, 40 mg every 2 weeks.   Insurance did not cover cover Stelara.  Punch biopsy 9/23/19 consistent with psoriasis     ____________________________________________    Assessment & Plan:     # Plaque Psoriasis,  Improving after recently started Humira CF.  Continue Humira CF 40 mg subcutaneously every 2 weeks.  Reviewed Biologics and potential risk for infections.  Patient to keep up with vaccinations, except live vaccinations, then recommend holding the medication.  -Hold medication if become ill until better.    #2 history of ulcerative colitis, no change with Humira.  A GI referral was sent as patient does not have good follow-up for this.  In the past he was briefly followed at [facility].  He would prefer to stay at the [facility] system.      Procedures Performed:    None    Follow-up: 3 week(s) virtually (telephone with photos), or earlier for new or changing lesions    Staff:     All risks, benefits and alternatives were discussed with patient.  Patient is in agreement and understands the assessment and plan.  All questions were answered.  Sun Screen Education was given.   Return to Clinic in 3 months or sooner as needed.   Estephania Rocha PA-C   ____________________________________________    CC: Derm Problem (robert is having this visit today to discuss  his psoriasis, states that it is clearing up)    HPI:  Mr. Robert Shoemaker is a(n) 35 year old male who presents today as a return patient for plaque psoriasis.  I spoke with him on the phone and reviewed his photos.  He was last seen at the end of December 2020.  I prescribed him Stelara at that time.  This was not covered under his insurance.  Humira was then prescribed.  This process took about 2 months and so he just recently took his first few injections.  He has noticed significant improvement already and feels better.    He denies any change in his ulcerative colitis symptoms and this is still active.    No fevers, arthralgias or numbness.  He has not been ill.    Patient is otherwise feeling well, without additional skin concerns.    Labs Reviewed:  CBC, CMP, M tuberculosis quant.     Physical Exam:  Vitals: There were no vitals taken for this visit.  SKIN: Teledermatology photos were reviewed; image quality and interpretability: Acceptable. Image date: 3/15/21  -Face is now clear.  There are thinner light pink plaques noted on the abdomen and chest.  There is a faint pink scaly plaque on the left knee.  - No other lesions of concern on areas examined.     Medications:  Current Outpatient Medications   Medication     adalimumab (HUMIRA *CF*) 40 MG/0.4ML pen kit     adalimumab (HUMIRA *CF*) 80 MG/0.8ML pen kit     No current facility-administered medications for this visit.       Past Medical/Surgical History:   There is no problem list on file for this patient.    No past medical history on file.    CC Referred Self, MD  No address on file on close of this encounter.

## 2021-03-15 NOTE — NURSING NOTE
Dermatology Rooming Note    Robert Shoemaker's goals for this visit include:   Chief Complaint   Patient presents with     Derm Problem     robert is having this visit today to discuss his psoriasis, states that it is clearing up     Malissa Diamond CMA on 3/15/2021 at 10:44 AM

## 2021-03-15 NOTE — PROGRESS NOTES
[facility] Dermatology Note  Encounter Date: Mar 15, 2021  Store-and-Forward and Telephone ([phone]). Location of teledermatologist: [facility] [place].  Start time: 10:51 End time: 10:59 am.    Dermatology Problem List:  1. Plaque psoriasis in a patient with ulcerative colitis  Humira CF, 40 mg every 2 weeks.   Insurance did not cover cover Stelara.  Punch biopsy 9/23/19 consistent with psoriasis     ____________________________________________    Assessment & Plan:     # Plaque Psoriasis,  Improving after recently started Humira CF.  Continue Humira CF 40 mg subcutaneously every 2 weeks.  Reviewed Biologics and potential risk for infections.  Patient to keep up with vaccinations, except live vaccinations, then recommend holding the medication.  -Hold medication if become ill until better.    #2 history of ulcerative colitis, no change with Humira.  A GI referral was sent as patient does not have good follow-up for this.  In the past he was briefly followed at [facility].  He would prefer to stay at the [facility] system.      Procedures Performed:    None    Follow-up: 3 week(s) virtually (telephone with photos), or earlier for new or changing lesions    Staff:     All risks, benefits and alternatives were discussed with patient.  Patient is in agreement and understands the assessment and plan.  All questions were answered.  Sun Screen Education was given.   Return to Clinic in 3 months or sooner as needed.   Estephania Rocha PA-C   ____________________________________________    CC: Derm Problem (robert is having this visit today to discuss his psoriasis, states that it is clearing up)    HPI:  Mr. Robert Shoemaker is a(n) 35 year old male who presents today as a return patient for plaque psoriasis.  I spoke with him on the phone and reviewed his photos.  He was last seen at the end of December 2020.  I prescribed him Stelara at that time.  This was not  covered under his insurance.  Humira was then prescribed.  This process took about 2 months and so he just recently took his first few injections.  He has noticed significant improvement already and feels better.    He denies any change in his ulcerative colitis symptoms and this is still active.    No fevers, arthralgias or numbness.  He has not been ill.    Patient is otherwise feeling well, without additional skin concerns.    Labs Reviewed:  CBC, CMP, M tuberculosis quant.     Physical Exam:  Vitals: There were no vitals taken for this visit.  SKIN: Teledermatology photos were reviewed; image quality and interpretability: Acceptable. Image date: 3/15/21  -Face is now clear.  There are thinner light pink plaques noted on the abdomen and chest.  There is a faint pink scaly plaque on the left knee.  - No other lesions of concern on areas examined.     Medications:  Current Outpatient Medications   Medication     adalimumab (HUMIRA *CF*) 40 MG/0.4ML pen kit     adalimumab (HUMIRA *CF*) 80 MG/0.8ML pen kit     No current facility-administered medications for this visit.       Past Medical/Surgical History:   There is no problem list on file for this patient.    No past medical history on file.    CC Referred Self, MD  No address on file on close of this encounter.

## 2021-04-12 NOTE — TELEPHONE ENCOUNTER
REFERRAL INFORMATION:    Referring Provider:  Aj    Referring Clinic:  [facility]    Reason for Visit/Diagnosis: Chronic ulcerative colitis     FUTURE VISIT INFORMATION:    Appointment Date: 5.18.21    Appointment Time: 2:20 PM     NOTES STATUS DETAILS   OFFICE NOTE from Referring Provider Internal 3.25.21 VERNON Rocha   OFFICE NOTE from Other Specialist Received  9/15/15 Office visit with Dr. Renee Ramírez ([facility])   HOSPITAL DISCHARGE SUMMARY/  ED VISITS N/A    OPERATIVE REPORT Received Sigmoidoscopy: 5/4/11 ([facility])   MEDICATION LIST Internal         ENDOSCOPY  N/A    COLONOSCOPY N/A    ERCP N/A    EUS N/A    STOOL TESTING N/A    PERTINENT LABS Received/ Internal/ Care Everywhere    PATHOLOGY REPORTS (RELATED) Received  5/4/11 ([facility])    IMAGING (CT, MRI, EGD, MRCP, Small Bowel Follow Through/SBT, MR/CT Enterography) N/A      Action 4.12.21 MJ   Action Taken Called pt to see if medical records are elsewhere. No answer.      5/10/2021 2:55pm Fax request sent to [facility] ([phone]) for med recs. Alex     5/10/2021 3:53pm Received recs from [facility]; sent to scan.  A copy was placed in MD's folder in Right Fax. Alex

## 2021-05-18 ENCOUNTER — PRE VISIT (OUTPATIENT)
Dept: GASTROENTEROLOGY | Facility: CLINIC | Age: 36
End: 2021-05-18

## 2021-05-18 ENCOUNTER — VIRTUAL VISIT (OUTPATIENT)
Dept: GASTROENTEROLOGY | Facility: CLINIC | Age: 36
End: 2021-05-18
Attending: PHYSICIAN ASSISTANT
Payer: COMMERCIAL

## 2021-05-18 VITALS — WEIGHT: 190 LBS | BODY MASS INDEX: 25.77 KG/M2

## 2021-05-18 DIAGNOSIS — K60.2 ANAL FISSURE: Primary | ICD-10-CM

## 2021-05-18 DIAGNOSIS — K51.90 CHRONIC ULCERATIVE COLITIS WITHOUT COMPLICATION, UNSPECIFIED LOCATION (H): ICD-10-CM

## 2021-05-18 PROCEDURE — 99204 OFFICE O/P NEW MOD 45 MIN: CPT | Mod: 95 | Performed by: INTERNAL MEDICINE

## 2021-05-18 NOTE — PROGRESS NOTES
"Robert Shoemaker is a 35 year old male who is being evaluated via a billable video visit.      The patient has been notified of following:     \"This video visit will be conducted via a call between you and your physician/provider. We have found that certain health care needs can be provided without the need for an in-person physical exam.  This service lets us provide the care you need with a video conversation.  If a prescription is necessary we can send it directly to your pharmacy.  If lab work is needed we can place an order for that and you can then stop by our lab to have the test done at a later time.    If during the course of the call the physician/provider feels a video visit is not appropriate, you will not be charged for this service.\"     Patient confirmed that they are in [place] for today's visit yes.    Video-Visit Details  Type of service:  Video Visit    Video Start Time: 2:29 PM  Video End Time:  2:57 PM    Originating Location (pt. Location): Home    Distant Location (provider location):  [facility]STROENTEROLOGY [facility] [place]     Platform used: [facility] UC NEW       PATIENT: Robert Shoemaker    MRN: [id number]    Date of Birth 1985    Tel: There are no phone numbers on file.    PCP: [facility]     HPI: Mr. Shoemaker is a 35 year old male here to establish care for UC.     UC history    In 2011 developed abdominal pain and blood in stool. Underwent a flex sig that showed proctosigmoiditis. Treated with prednisone.Has not had UC related care since then.     Has PS treated with Humira every 2 weeks. Endorses abdominal pain prior to every stool.     Reports perianal itching and blood with wiping. Does have pain with defecation.   Chews tobacco     Macroscopic extent of disease (most recent) E2    Current UC symptoms    Bowel frequency in day 1-4   Bowel frequency in night 0-1  Urgency of defecation YES   Blood in stool none  General well " being 1 = slightly below average  Extracolonic features (multiple select) knees arthralgia, PS, no mouth ulcers, no eye issues     Constitutional symptoms:  Fever NO  Weight loss NO    Noteworthy diet history- none    Other GI symptoms present none     Total number of IBD surgeries (except perianal): none     Current IBD Medications:  None for IBD, but does take Humira EOW for PS     Past IBD Medications:   prednisone    Past Medical History:   Diagnosis Date     Psoriasis         Past Surgical History:   Procedure Laterality Date     AS KNEE SCOPE,MED/LAT MENISCUS REPAIR       HERNIA REPAIR         Social History     Tobacco Use     Smoking status: Former Smoker     Smokeless tobacco: Current User   Substance Use Topics     Alcohol use: Yes       Family History   Problem Relation Age of Onset     Hypertension Mother      Inflammatory Bowel Disease No family hx of      Autoimmune Disease No family hx of      Colon Cancer No family hx of        No Known Allergies     Outpatient Encounter Medications as of 5/18/2021   Medication Sig Dispense Refill     adalimumab (HUMIRA *CF*) 40 MG/0.4ML pen kit Inject 0.4 mLs (40 mg) Subcutaneous every 14 days 0.8 mL 2     adalimumab (HUMIRA *CF*) 80 MG/0.8ML pen kit Inject 0.8 mLs (80 mg) Subcutaneous See Admin Instructions for 1 dose 0.8 mL 0     No facility-administered encounter medications on file as of 5/18/2021.         NSAID  YES ibuprofen occasionally for MSK pain/HA     Review of Systems  Complete 10 System ROS performed. All are negative except as documented below, in the HPI, or in patient questionnaire from today's visit.    1) Constitutional: No fevers, chills, night sweats or malaise, weight loss or gain  2) Skin: No rash  3) Pulmonary: No wheeze, SOB, cough, sputum or hemoptysis  4) Cardiovascular: No Chest pain or palpitations  5) Genitourinary: No blood in urine or dysuria  6) Endocrine: No increased sweating, hunger, thirst or thyroid problems  7) Hematologic: No  bruising and easy bleeding  8) Musculoskeletal: no new pain in joints or limitation in ROM  9) Neurologic: No dizziness, paresthesias or weakness or falls  10) Psychiatric:  not depressed/anxious, no sleep problems    PHYSICAL EXAM  Vitals: Wt 86.2 kg (190 lb)   BMI 25.77 kg/m      General appearance  Healthy appearing adult, in no acute distress     Eyes  Sclera anicteric  Pupils round and reactive to light     Ears, nose, mouth and throat  No obvious external lesions of ears and nose  Hearing intact     Neck  Symmetric  No obvious external lesions     Respiratory  Normal respiration, no use of accessory muscles      MSK  Gait normal     Skin  No rashes or jaundice      Psychiatric  Oriented to person, place and time  Appropriate mood and affect.     DATA:  Reviewed in detail past documentation, medications and prior workup available in electronic health records or through outside records.    PERTINENT STUDIES:  Most recent CBC:  WBC   Date Value Ref Range Status   12/16/2020 7.0 4.0 - 11.0 10e9/L Final   ]  Hemoglobin   Date Value Ref Range Status   12/16/2020 16.2 13.3 - 17.7 g/dL Final   ]   Platelet Count   Date Value Ref Range Status   12/16/2020 180 150 - 450 10e9/L Final     Most recent hepatic panel:  AST   Date Value Ref Range Status   12/16/2020 36 0 - 45 U/L Final     ALT   Date Value Ref Range Status   12/16/2020 56 0 - 70 U/L Final     No results found for: BILICONJ   Bilirubin Total   Date Value Ref Range Status   12/16/2020 0.5 0.2 - 1.3 mg/dL Final     Albumin   Date Value Ref Range Status   12/16/2020 4.1 3.4 - 5.0 g/dL Final     Alkaline Phosphatase   Date Value Ref Range Status   12/16/2020 100 40 - 150 U/L Final       Most recent creatinine:  Creatinine   Date Value Ref Range Status   12/16/2020 0.94 0.66 - 1.25 mg/dL Final     Endoscopy:   Flex sig in 5/2011 showed proctosigmoiditis. Path: moderately active chronic colitis.    Imaging:  none    IMPRESSION:  Mr. 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 He is establishing care. He endorses occasional loose stools and abdominal pain. His perianal itching and pain with defecation with blood on TP is likely from an anal fissure. We will proceed with the following plan:    DIAGNOSES:  # E2 UC  # Anal fissure    PLAN:  --Diltiazam cream for anal fissure healing  --Colonoscopy under CS to determine severity and extent of disease. If there is active disease, he may benefit from increasing to weekly Humira.     Misc:  -- Avoid tobacco use (chewing tobacco) - this was discussed   -- Avoid NSAIDs as there is potentially a 25% chance of causing an IBD flare - discussed today     Return in about 2 months (around 7/18/2021).    Monica Ramos MD   of Medicine  Division of Gastroenterology, Hepatology and Nutrition  [facility]    May 18, 2021    45 minutes spent on the date of the encounter performing chart review, history and exam, documentation and further activities as noted above.

## 2021-05-18 NOTE — LETTER
"    5/18/2021         RE: Robert Shoemaker  [address] [facility] [address]        Dear Colleague,    Thank you for referring your patient, Robert Shoemaker, to the [facility] GASTROENTEROLOGY CLINIC [place]. Please see a copy of my visit note below.    Robert Shoemaker is a 35 year old male who is being evaluated via a billable video visit.      The patient has been notified of following:     \"This video visit will be conducted via a call between you and your physician/provider. We have found that certain health care needs can be provided without the need for an in-person physical exam.  This service lets us provide the care you need with a video conversation.  If a prescription is necessary we can send it directly to your pharmacy.  If lab work is needed we can place an order for that and you can then stop by our lab to have the test done at a later time.    If during the course of the call the physician/provider feels a video visit is not appropriate, you will not be charged for this service.\"     Patient confirmed that they are in [place] for today's visit yes.    Video-Visit Details  Type of service:  Video Visit    Video Start Time: 2:29 PM  Video End Time:  2:57 PM    Originating Location (pt. Location): Home    Distant Location (provider location):  [facility] GASTROENTEROLOGY [facility]     Platform used: [employer]    [facility] UC NEW       PATIENT: Robert Shoemaker    MRN: [id number]    Date of Birth 1985    Tel: There are no phone numbers on file.    PCP: [facility] [place] [facility]     HPI: Mr. Shoemaker is a 35 year old male here to establish care for UC.     UC history    In 2011 developed abdominal pain and blood in stool. Underwent a flex sig that showed proctosigmoiditis. Treated with prednisone.Has not had UC related care since then.     Has PS treated with Humira every 2 weeks. Endorses abdominal pain prior to every stool.     Reports perianal itching " and blood with wiping. Does have pain with defecation.   Chews tobacco     Macroscopic extent of disease (most recent) E2    Current UC symptoms    Bowel frequency in day 1-4   Bowel frequency in night 0-1  Urgency of defecation YES   Blood in stool none  General well being 1 = slightly below average  Extracolonic features (multiple select) knees arthralgia, PS, no mouth ulcers, no eye issues     Constitutional symptoms:  Fever NO  Weight loss NO    Noteworthy diet history- none    Other GI symptoms present none     Total number of IBD surgeries (except perianal): none     Current IBD Medications:  None for IBD, but does take Humira EOW for PS     Past IBD Medications:   prednisone    Past Medical History:   Diagnosis Date     Psoriasis         Past Surgical History:   Procedure Laterality Date     AS KNEE SCOPE,MED/LAT MENISCUS REPAIR       HERNIA REPAIR         Social History     Tobacco Use     Smoking status: Former Smoker     Smokeless tobacco: Current User   Substance Use Topics     Alcohol use: Yes       Family History   Problem Relation Age of Onset     Hypertension Mother      Inflammatory Bowel Disease No family hx of      Autoimmune Disease No family hx of      Colon Cancer No family hx of        No Known Allergies     Outpatient Encounter Medications as of 5/18/2021   Medication Sig Dispense Refill     adalimumab (HUMIRA *CF*) 40 MG/0.4ML pen kit Inject 0.4 mLs (40 mg) Subcutaneous every 14 days 0.8 mL 2     adalimumab (HUMIRA *CF*) 80 MG/0.8ML pen kit Inject 0.8 mLs (80 mg) Subcutaneous See Admin Instructions for 1 dose 0.8 mL 0     No facility-administered encounter medications on file as of 5/18/2021.         NSAID  YES ibuprofen occasionally for MSK pain/HA     Review of Systems  Complete 10 System ROS performed. All are negative except as documented below, in the HPI, or in patient questionnaire from today's visit.    1) Constitutional: No fevers, chills, night sweats or malaise, weight loss or  gain  2) Skin: No rash  3) Pulmonary: No wheeze, SOB, cough, sputum or hemoptysis  4) Cardiovascular: No Chest pain or palpitations  5) Genitourinary: No blood in urine or dysuria  6) Endocrine: No increased sweating, hunger, thirst or thyroid problems  7) Hematologic: No bruising and easy bleeding  8) Musculoskeletal: no new pain in joints or limitation in ROM  9) Neurologic: No dizziness, paresthesias or weakness or falls  10) Psychiatric:  not depressed/anxious, no sleep problems    PHYSICAL EXAM  Vitals: Wt 86.2 kg (190 lb)   BMI 25.77 kg/m      General appearance  Healthy appearing adult, in no acute distress     Eyes  Sclera anicteric  Pupils round and reactive to light     Ears, nose, mouth and throat  No obvious external lesions of ears and nose  Hearing intact     Neck  Symmetric  No obvious external lesions     Respiratory  Normal respiration, no use of accessory muscles      MSK  Gait normal     Skin  No rashes or jaundice      Psychiatric  Oriented to person, place and time  Appropriate mood and affect.     DATA:  Reviewed in detail past documentation, medications and prior workup available in electronic health records or through outside records.    PERTINENT STUDIES:  Most recent CBC:  WBC   Date Value Ref Range Status   12/16/2020 7.0 4.0 - 11.0 10e9/L Final   ]  Hemoglobin   Date Value Ref Range Status   12/16/2020 16.2 13.3 - 17.7 g/dL Final   ]   Platelet Count   Date Value Ref Range Status   12/16/2020 180 150 - 450 10e9/L Final     Most recent hepatic panel:  AST   Date Value Ref Range Status   12/16/2020 36 0 - 45 U/L Final     ALT   Date Value Ref Range Status   12/16/2020 56 0 - 70 U/L Final     No results found for: BILICONJ   Bilirubin Total   Date Value Ref Range Status   12/16/2020 0.5 0.2 - 1.3 mg/dL Final     Albumin   Date Value Ref Range Status   12/16/2020 4.1 3.4 - 5.0 g/dL Final     Alkaline Phosphatase   Date Value Ref Range Status   12/16/2020 100 40 - 150 U/L Final       Most  recent creatinine:  Creatinine   Date Value Ref Range Status   12/16/2020 0.94 0.66 - 1.25 mg/dL Final     Endoscopy:   Flex sig in 5/2011 showed proctosigmoiditis. Path: moderately active chronic colitis.    Imaging:  none    IMPRESSION:  Mr. 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 He is establishing care. He endorses occasional loose stools and abdominal pain. His perianal itching and pain with defecation with blood on TP is likely from an anal fissure. We will proceed with the following plan:    DIAGNOSES:  # E2 UC  # Anal fissure    PLAN:  --Diltiazam cream for anal fissure healing  --Colonoscopy under CS to determine severity and extent of disease. If there is active disease, he may benefit from increasing to weekly Humira.     Misc:  -- Avoid tobacco use (chewing tobacco) - this was discussed   -- Avoid NSAIDs as there is potentially a 25% chance of causing an IBD flare - discussed today     Return in about 2 months (around 7/18/2021).    Monica Ramos MD   of Medicine  Division of Gastroenterology, Hepatology and Nutrition  [facility]    May 18, 2021    45 minutes spent on the date of the encounter performing chart review, history and exam, documentation and further activities as noted above.

## 2021-05-18 NOTE — NURSING NOTE
Chief Complaint   Patient presents with     New Patient       Vitals:    05/18/21 1407   Weight: 86.2 kg (190 lb)       Body mass index is 25.77 kg/m .    Carmen Zepeda CMA

## 2021-05-31 DIAGNOSIS — Z11.59 ENCOUNTER FOR SCREENING FOR OTHER VIRAL DISEASES: ICD-10-CM

## 2021-06-15 ENCOUNTER — TELEPHONE (OUTPATIENT)
Dept: GASTROENTEROLOGY | Facility: CLINIC | Age: 36
End: 2021-06-15

## 2021-06-15 NOTE — TELEPHONE ENCOUNTER
Pre assessment questions completed for upcoming colonoscopy procedure scheduled on 6/28/21    COVID test scheduled 6/24/21    Reviewed Miralax prep instructions with patient.     Procedural arrival time and facility location reviewed.    Designated  policy reviewed.    Patient verbalized understanding and had no questions or concerns at this time.    Divina Hernandez RN

## 2021-06-17 DIAGNOSIS — L40.0 PLAQUE PSORIASIS: ICD-10-CM

## 2021-06-24 DIAGNOSIS — Z11.59 ENCOUNTER FOR SCREENING FOR OTHER VIRAL DISEASES: ICD-10-CM

## 2021-06-24 LAB
LABORATORY COMMENT REPORT: NORMAL
SARS-COV-2 RNA RESP QL NAA+PROBE: NEGATIVE
SARS-COV-2 RNA RESP QL NAA+PROBE: NORMAL
SPECIMEN SOURCE: NORMAL
SPECIMEN SOURCE: NORMAL

## 2021-06-24 PROCEDURE — U0005 INFEC AGEN DETEC AMPLI PROBE: HCPCS | Performed by: INTERNAL MEDICINE

## 2021-06-24 PROCEDURE — U0003 INFECTIOUS AGENT DETECTION BY NUCLEIC ACID (DNA OR RNA); SEVERE ACUTE RESPIRATORY SYNDROME CORONAVIRUS 2 (SARS-COV-2) (CORONAVIRUS DISEASE [COVID-19]), AMPLIFIED PROBE TECHNIQUE, MAKING USE OF HIGH THROUGHPUT TECHNOLOGIES AS DESCRIBED BY CMS-2020-01-R: HCPCS | Performed by: INTERNAL MEDICINE

## 2021-06-28 ENCOUNTER — HOSPITAL ENCOUNTER (OUTPATIENT)
Facility: AMBULATORY SURGERY CENTER | Age: 36
Discharge: HOME | End: 2021-06-28
Attending: INTERNAL MEDICINE | Admitting: INTERNAL MEDICINE
Payer: COMMERCIAL

## 2021-06-28 VITALS
HEIGHT: 72 IN | SYSTOLIC BLOOD PRESSURE: 150 MMHG | DIASTOLIC BLOOD PRESSURE: 90 MMHG | WEIGHT: 197 LBS | OXYGEN SATURATION: 96 % | HEART RATE: 82 BPM | BODY MASS INDEX: 26.68 KG/M2 | RESPIRATION RATE: 16 BRPM | TEMPERATURE: 97.4 F

## 2021-06-28 LAB — COLONOSCOPY: NORMAL

## 2021-06-28 PROCEDURE — 88305 TISSUE EXAM BY PATHOLOGIST: CPT | Performed by: PATHOLOGY

## 2021-06-28 PROCEDURE — 45385 COLONOSCOPY W/LESION REMOVAL: CPT

## 2021-06-28 PROCEDURE — 45380 COLONOSCOPY AND BIOPSY: CPT | Mod: 59

## 2021-06-28 RX ORDER — FENTANYL CITRATE 50 UG/ML
INJECTION, SOLUTION INTRAMUSCULAR; INTRAVENOUS PRN
Status: DISCONTINUED | OUTPATIENT
Start: 2021-06-28 | End: 2021-06-28 | Stop reason: HOSPADM

## 2021-06-28 RX ORDER — ONDANSETRON 2 MG/ML
4 INJECTION INTRAMUSCULAR; INTRAVENOUS
Status: DISCONTINUED | OUTPATIENT
Start: 2021-06-28 | End: 2021-06-29 | Stop reason: HOSPADM

## 2021-06-28 RX ORDER — SIMETHICONE
LIQUID (ML) MISCELLANEOUS PRN
Status: DISCONTINUED | OUTPATIENT
Start: 2021-06-28 | End: 2021-06-28 | Stop reason: HOSPADM

## 2021-06-28 RX ORDER — ONDANSETRON 2 MG/ML
4 INJECTION INTRAMUSCULAR; INTRAVENOUS EVERY 6 HOURS PRN
Status: CANCELLED | OUTPATIENT
Start: 2021-06-28

## 2021-06-28 RX ORDER — FLUMAZENIL 0.1 MG/ML
0.2 INJECTION, SOLUTION INTRAVENOUS
Status: CANCELLED | OUTPATIENT
Start: 2021-06-28 | End: 2021-06-29

## 2021-06-28 RX ORDER — NALOXONE HYDROCHLORIDE 0.4 MG/ML
0.2 INJECTION, SOLUTION INTRAMUSCULAR; INTRAVENOUS; SUBCUTANEOUS
Status: CANCELLED | OUTPATIENT
Start: 2021-06-28

## 2021-06-28 RX ORDER — NALOXONE HYDROCHLORIDE 0.4 MG/ML
0.4 INJECTION, SOLUTION INTRAMUSCULAR; INTRAVENOUS; SUBCUTANEOUS
Status: CANCELLED | OUTPATIENT
Start: 2021-06-28

## 2021-06-28 RX ORDER — PROCHLORPERAZINE MALEATE 10 MG
10 TABLET ORAL EVERY 6 HOURS PRN
Status: CANCELLED | OUTPATIENT
Start: 2021-06-28

## 2021-06-28 RX ORDER — ONDANSETRON 4 MG/1
4 TABLET, ORALLY DISINTEGRATING ORAL EVERY 6 HOURS PRN
Status: CANCELLED | OUTPATIENT
Start: 2021-06-28

## 2021-06-28 RX ORDER — LIDOCAINE 40 MG/G
CREAM TOPICAL
Status: DISCONTINUED | OUTPATIENT
Start: 2021-06-28 | End: 2021-06-29 | Stop reason: HOSPADM

## 2021-06-28 ASSESSMENT — MIFFLIN-ST. JEOR: SCORE: 1861.59

## 2021-06-28 NOTE — H&P
Robert Shoemaker  [id number]  male  35 year old      Reason for procedure/surgery: ulcerative colitis    There is no problem list on file for this patient.      Past Surgical History:    Past Surgical History:   Procedure Laterality Date     AS KNEE SCOPE,MED/LAT MENISCUS REPAIR       HERNIA REPAIR         Past Medical History:   Past Medical History:   Diagnosis Date     Psoriasis        Social History:   Social History     Tobacco Use     Smoking status: Former Smoker     Smokeless tobacco: Current User   Substance Use Topics     Alcohol use: Yes       Family History:   Family History   Problem Relation Age of Onset     Hypertension Mother      Inflammatory Bowel Disease No family hx of      Autoimmune Disease No family hx of      Colon Cancer No family hx of        Allergies: No Known Allergies    Active Medications:   Current Outpatient Medications   Medication Sig Dispense Refill     adalimumab (HUMIRA *CF*) 40 MG/0.4ML pen kit Inject 0.4 mLs (40 mg) Subcutaneous every 14 days 0.8 mL 2     adalimumab (HUMIRA *CF*) 80 MG/0.8ML pen kit Inject 0.8 mLs (80 mg) Subcutaneous See Admin Instructions for 1 dose 0.8 mL 0     diltiazem 2% in lipovan cream 2% GEL Apply 1 g topically 3 times daily To external anal opening for 8 weeks 90 g 1       Systemic Review:   CONSTITUTIONAL: NEGATIVE for fever, chills, change in weight  ENT/MOUTH: NEGATIVE for ear, mouth and throat problems  RESP: NEGATIVE for significant cough or SOB  CV: NEGATIVE for chest pain, palpitations or peripheral edema    Physical Examination:   Vital Signs: BP (!) 134/90   Pulse 78   Temp 98.2  F (36.8  C) (Temporal)   Resp 18   Ht 1.829 m (6')   Wt 89.4 kg (197 lb)   SpO2 92%   BMI 26.72 kg/m    GENERAL: healthy, alert and no distress  NECK: no adenopathy, no asymmetry, masses, or scars  RESP: lungs clear to auscultation - no rales, rhonchi or wheezes  CV: regular rate and rhythm, normal S1 S2, no S3 or S4, no murmur, click or rub, no peripheral  edema and peripheral pulses strong  ABDOMEN: soft, nontender, no hepatosplenomegaly, no masses and bowel sounds normal  MS: no gross musculoskeletal defects noted, no edema    Plan: Appropriate to proceed as scheduled.      Monica Ramos MD  6/28/2021    PCP:  [facility]

## 2021-06-29 LAB — COPATH REPORT: NORMAL

## 2021-09-10 DIAGNOSIS — L40.0 PLAQUE PSORIASIS: ICD-10-CM

## 2021-09-14 NOTE — TELEPHONE ENCOUNTER
HUMIRA PEN 40 MG/0.4ML SUBCUTANEOUS PEN-INJECTOR KIT  Last Written Prescription Date:  6/7/2021  Last Fill Quantity: 0.8,   # refills: 2  Last Office Visit : 3/15/2021  Future Office visit:  9/27/2021    Routing refill request to provider for review/approval because:  Drug not on the [facility], P or [facility] refill protocol or controlled substance      Diana Lopez RN  Central Triage Red Flags/Med Refills

## 2021-09-15 RX ORDER — ADALIMUMAB 40MG/0.4ML
KIT SUBCUTANEOUS
Qty: 0.8 ML | Refills: 1 | Status: SHIPPED | OUTPATIENT
Start: 2021-09-15 | End: 2021-10-08

## 2021-10-03 ENCOUNTER — HEALTH MAINTENANCE LETTER (OUTPATIENT)
Age: 36
End: 2021-10-03

## 2021-10-07 DIAGNOSIS — L40.0 PLAQUE PSORIASIS: ICD-10-CM

## 2021-10-08 RX ORDER — ADALIMUMAB 40MG/0.4ML
KIT SUBCUTANEOUS
Qty: 0.8 ML | Refills: 2 | OUTPATIENT
Start: 2021-10-08 | End: 2021-10-11

## 2021-10-08 NOTE — TELEPHONE ENCOUNTER
Health Call Center    Phone Message    May a detailed message be left on voicemail: no     Reason for Call: Medication Refill Request    Has the patient contacted the pharmacy for the refill? Yes   Name of medication being requested:   Humira    Provider who prescribed the medication:   Estephania Rocha    Pharmacy:  [address]  Date medication is needed: ASAP     Pt notified of 72-business hour turn around time on refill requests      Action Taken: Message routed to:  [facility] Derm    Travel Screening: Not Applicable     Pharmacy told Pt to call and request a Rush on Rx refill. Writer notified Pt that Rx has been requested. Caller provided the following numbers for clinic to call to rush Rx:  electronic [phone]  verbal [phone]

## 2021-10-11 ENCOUNTER — VIRTUAL VISIT (OUTPATIENT)
Dept: DERMATOLOGY | Facility: CLINIC | Age: 36
End: 2021-10-11
Payer: COMMERCIAL

## 2021-10-11 ENCOUNTER — MYC MEDICAL ADVICE (OUTPATIENT)
Dept: DERMATOLOGY | Facility: CLINIC | Age: 36
End: 2021-10-11

## 2021-10-11 DIAGNOSIS — L81.0 POSTINFLAMMATORY HYPERPIGMENTATION: ICD-10-CM

## 2021-10-11 DIAGNOSIS — Z79.899 ENCOUNTER FOR LONG-TERM (CURRENT) USE OF HIGH-RISK MEDICATION: ICD-10-CM

## 2021-10-11 DIAGNOSIS — L40.0 PLAQUE PSORIASIS: Primary | ICD-10-CM

## 2021-10-11 PROCEDURE — 99213 OFFICE O/P EST LOW 20 MIN: CPT | Mod: GQ | Performed by: DERMATOLOGY

## 2021-10-11 RX ORDER — ADALIMUMAB 40MG/0.4ML
40 KIT SUBCUTANEOUS
Qty: 0.8 ML | Refills: 11 | Status: SHIPPED | OUTPATIENT
Start: 2021-10-11 | End: 2022-03-27

## 2021-10-11 ASSESSMENT — PAIN SCALES - GENERAL: PAINLEVEL: NO PAIN (0)

## 2021-10-11 NOTE — LETTER
10/11/2021       RE: Robert Shoemaker  [address] [facility] [address]     Dear Colleague,    Thank you for referring your patient, oRbert Shoemaker, to the [facility] DERMATOLOGY CLINIC [place] [facility]. Please see a copy of my visit note below.    [facility] Dermatology Note  Encounter Date: Oct 11, 2021  [employer] and Telephone ([phone] ). Location of teledermatologist: [facility] DERMATOLOGY [facility].  Start time: 9:24. End time: 9:29.    Dermatology Problem List:  1. Plaque psoriasis in a patient with ulcerative colitis; biopsy-proven  - adalimumab 40 mg q14 days  - insurance did not cover ustekinumab  - TB test: 12/2020    ____________________________________________    Assessment & Plan:     1. Psoriasis: doing very well on adalimumab with a few areas of postinflammatory hyperpigmentation, which we discussed. Due for TB test in 12/2021. Will continue current regimen.  - adalimumab 40 mg q14 days  - check quantiferon    Procedures Performed:    None    Follow-up: 1 year    Staff:     Chris Kolb MD, FAAD   of Dermatology  Department of Dermatology  [facility]    ____________________________________________    CC: Psoriasis (Robert, is here for a Psoriasis, no other concerns )    HPI:  Mr. Robert Shoemaker is a(n) 36 year old male who presents today as a return patient for psoriasis    Psoriasis - doing well on adalimumab - psoriasis doing well  - a few areas of increased pigmentation in areas where psoriasis was  - a few psoriasis spots on the elbow otherwise doing well    Patient is otherwise feeling well, without additional skin concerns.    Labs Reviewed:  12/2020 quantiferon negative    Physical Exam:  Vitals: There were no vitals taken for this visit.  SKIN: Teledermatology photos were reviewed; image quality and interpretability:  acceptable. Image date: 10/11/21.  - few pink papules on the extensor elbow; other areas clear  - No other lesions of concern on areas examined.     Medications:  Current Outpatient Medications   Medication     adalimumab (HUMIRA *CF*) 80 MG/0.8ML pen kit     diltiazem 2% in lipovan cream 2% GEL     HUMIRA *CF* PEN 40 MG/0.4ML pen kit     No current facility-administered medications for this visit.      Past Medical/Surgical History:   There is no problem list on file for this patient.    Past Medical History:   Diagnosis Date     Psoriasis        CC No referring provider defined for this encounter. on close of this encounter.

## 2021-10-11 NOTE — PROGRESS NOTES
[facility] Dermatology Note  Encounter Date: Oct 11, 2021  Store-and-Forward and Telephone ([phone] ). Location of teledermatologist: [facility] DERMATOLOGY CLINIC [place].  Start time: 9:24. End time: 9:29.    Dermatology Problem List:  1. Plaque psoriasis in a patient with ulcerative colitis; biopsy-proven  - adalimumab 40 mg q14 days  - insurance did not cover ustekinumab  - TB test: 12/2020    ____________________________________________    Assessment & Plan:     1. Psoriasis: doing very well on adalimumab with a few areas of postinflammatory hyperpigmentation, which we discussed. Due for TB test in 12/2021. Will continue current regimen.  - adalimumab 40 mg q14 days  - check quantiferon    Procedures Performed:    None    Follow-up: 1 year    Staff:     Chris Kolb MD, FAAD   of Dermatology  Department of Dermatology  [facility] [employer] [facility]    ____________________________________________    CC: Psoriasis (Robert, is here for a Psoriasis, no other concerns )    HPI:  Mr. Robert Shoemaker is a(n) 36 year old male who presents today as a return patient for psoriasis    Psoriasis - doing well on adalimumab - psoriasis doing well  - a few areas of increased pigmentation in areas where psoriasis was  - a few psoriasis spots on the elbow otherwise doing well    Patient is otherwise feeling well, without additional skin concerns.    Labs Reviewed:  12/2020 quantiferon negative    Physical Exam:  Vitals: There were no vitals taken for this visit.  SKIN: Teledermatology photos were reviewed; image quality and interpretability: acceptable. Image date: 10/11/21.  - few pink papules on the extensor elbow; other areas clear  - No other lesions of concern on areas examined.     Medications:  Current Outpatient Medications   Medication     adalimumab (HUMIRA *CF*) 80 MG/0.8ML pen kit     diltiazem 2% in lipovan cream 2% GEL     HUMIRA *CF* PEN 40  MG/0.4ML pen kit     No current facility-administered medications for this visit.      Past Medical/Surgical History:   There is no problem list on file for this patient.    Past Medical History:   Diagnosis Date     Psoriasis        CC No referring provider defined for this encounter. on close of this encounter.

## 2021-10-11 NOTE — NURSING NOTE
Chief Complaint   Patient presents with     Psoriasis     Robert, is here for a Psoriasis, no other concerns     Corey Lovell EMT

## 2022-01-23 ENCOUNTER — HEALTH MAINTENANCE LETTER (OUTPATIENT)
Age: 37
End: 2022-01-23

## 2022-03-25 DIAGNOSIS — L40.0 PLAQUE PSORIASIS: ICD-10-CM

## 2022-03-25 NOTE — TELEPHONE ENCOUNTER
[facility] Call Center    Phone Message    May a detailed message be left on voicemail: yes     Reason for Call: Medication Refill Request    Has the patient contacted the pharmacy for the refill? Yes   Name of medication being requested: Humira  Provider who prescribed the medication: Kennedi  Pharmacy: [address]  Date medication is needed: ASAP         Action Taken: Message routed to:  [facility] ([facility]): Med Refills    Travel Screening: Not Applicable

## 2022-03-25 NOTE — TELEPHONE ENCOUNTER
Call to [place], advised of prescription sent 10/11/21 good for 1 year, [place] reports pharmacy told him the clinic needed to do something so they can dispense medication  [facility] to [facility], technician reports had faxed a copy of the clinical note which needed to be completed along with needing chart notes, to complete prior authorization,  to dermatology February 2022.  Did not receive anything from clinic.  Reports the same requests was faxed again today to dermatology.  Unable to dispense further medication without information needed for prior auth.     adalimumab (HUMIRA *CF* PEN) 40 MG/0.4ML pen kit      Last Written Prescription Date:  10/11/21  Last Fill Quantity: 0.8 ml,   # refills: 11  Last Office Visit : 10/11/21 recommended 1 year follow up  Future Office visit:  None scheduled    Routing refill request to provider for review/approval because:  Pharmacy is needing clinical note completed along with chart notes to complete prior auth

## 2022-03-27 RX ORDER — ADALIMUMAB 40MG/0.4ML
40 KIT SUBCUTANEOUS
Qty: 0.8 ML | Refills: 11 | Status: SHIPPED | OUTPATIENT
Start: 2022-03-27

## 2022-03-28 ENCOUNTER — TELEPHONE (OUTPATIENT)
Dept: DERMATOLOGY | Facility: CLINIC | Age: 37
End: 2022-03-28
Payer: COMMERCIAL

## 2022-03-28 NOTE — TELEPHONE ENCOUNTER
PA Initiation    Medication: Humira - Initiated  Insurance Company: Other (see comments)Comment:  [facility]  Pharmacy Filling the Rx: [facility] - [place], [place] - [address] [facility] [facility]  Filling Pharmacy Phone:    Filling Pharmacy Fax:    Start Date: 3/28/2022    Called [facility] to initiate PA renewal request, rep will fax Prior Authorization form shortly

## 2022-04-08 NOTE — TELEPHONE ENCOUNTER
Prior Authorization Approval    Authorization Effective Date: 4/7/2022  Authorization Expiration Date: 4/7/2023  Medication: Humira - [place] Approved  Approved Dose/Quantity: 2 pens for 28 days  Reference #:     Insurance Company: Other (see comments)Comment:  [facility]  Expected CoPay:       CoPay Card Available:      Foundation Assistance Needed:    Which Pharmacy is filling the prescription (Not needed for infusion/clinic administered): [facility] [address]  Pharmacy Notified: No  Patient Notified: No

## 2022-09-10 ENCOUNTER — HEALTH MAINTENANCE LETTER (OUTPATIENT)
Age: 37
End: 2022-09-10

## 2023-02-02 ENCOUNTER — MYC MEDICAL ADVICE (OUTPATIENT)
Dept: DERMATOLOGY | Facility: CLINIC | Age: 38
End: 2023-02-02
Payer: COMMERCIAL

## 2023-02-23 ENCOUNTER — VIRTUAL VISIT (OUTPATIENT)
Dept: DERMATOLOGY | Facility: CLINIC | Age: 38
End: 2023-02-23
Payer: COMMERCIAL

## 2023-02-23 ENCOUNTER — TELEPHONE (OUTPATIENT)
Dept: DERMATOLOGY | Facility: CLINIC | Age: 38
End: 2023-02-23

## 2023-02-23 DIAGNOSIS — K51.00 CHRONIC ULCERATIVE ENTEROCOLITIS WITHOUT COMPLICATION (H): Primary | ICD-10-CM

## 2023-02-23 DIAGNOSIS — Z51.81 THERAPEUTIC DRUG MONITORING: ICD-10-CM

## 2023-02-23 DIAGNOSIS — L40.0 PSORIASIS VULGARIS: ICD-10-CM

## 2023-02-23 PROCEDURE — 99214 OFFICE O/P EST MOD 30 MIN: CPT | Mod: 95 | Performed by: DERMATOLOGY

## 2023-02-23 RX ORDER — TRIAMCINOLONE ACETONIDE 1 MG/G
CREAM TOPICAL
Qty: 454 G | Refills: 11 | Status: SHIPPED | OUTPATIENT
Start: 2023-02-23

## 2023-02-23 NOTE — TELEPHONE ENCOUNTER
PA Initiation    Medication: Humira  Insurance Company: [facility] - Phone [phone] Fax [phone]  Pharmacy Filling the Rx: [facility] - [place], [place] - [facility] [address]  Filling Pharmacy Phone:    Filling Pharmacy Fax:    Start Date: 2/23/2023    Key: [id number]

## 2023-02-23 NOTE — PATIENT INSTRUCTIONS
[facility] Dermatology Visit    Thank you for allowing us to participate in your care. Your findings, instructions and follow-up plan are as follows:     Psoriasis vulgaris.       When should I call my doctor?  If you are worsening or not improving, please, contact us or seek urgent care as noted below.     Who should I call with questions (adults)?  [facility] (adult and pediatric): [phone]  [facility] (adult): [phone]  For urgent needs outside of business hours call the [facility] at [phone] and ask for the dermatology resident on call  If this is a medical emergency and you are unable to reach an ER, Call 911    Who should I call with questions (pediatric)?  [facility]- Pediatric Dermatology  Dr. She Nieves, Dr. Alicia Sampson, Dr. Gila Ortiz, Estephania Rocha, [place]  Dr. Deborah Shelby, Dr. Autumn Santamaria & Dr. Chris Nugent  Non Urgent  Nurse Triage Line; [phone]- Tabatha and Bernie JONES Care Coordinators   Wendy (/Complex ) [phone]    If you need a prescription refill, please contact your pharmacy. Refills are approved or denied by our physicians during normal business hours, Monday through Fridays  Per office policy, refills will not be granted if you have not been seen within the past year (or sooner depending on your child's condition).    Scheduling Information:  Pediatric Appointment Scheduling and Call Center [phone]  Radiology Scheduling- [phone]  Sedation Unit Scheduling- [phone]  [facility] Scheduling- General [phone]; Pediatric Dermatology [phone]  Main  Services: [phone]  Polish: [phone]  Serbian: [phone]  Hmong/Bulgarian/Polish: [phone]  Preadmission Nursing Department Fax Number: [phone] (fax all pre-operative paperwork to this number)    For urgent matters arising during  evenings, weekends, or holidays that cannot wait for normal business hours please call [phone] and ask for the dermatology resident on call to be paged.

## 2023-02-23 NOTE — PROGRESS NOTES
[facility] Dermatology Note  Encounter Date: Feb 23, 2023  Telephone ([phone] ). Location of teledermatologist: [facility] [place]. Start time: 11:19 AM. End time: 11:30 AM.    Dermatology Problem List:  1. Plaque psoriasis +/- PsA in a patient with ulcerative colitis; biopsy-proven  - adalimumab; triam 0.1% cream BID PRN  - insurance did not cover ustekinumab  ____________________________________________    Assessment & Plan:     # Plaque psoriasis +/- PsA. Chronic, flare.   # Ulcerative colitis. Chronic, flare.   - Labs: quant gold  - Plan to restart adalimumab pending insurance approval with loading dose  - Start triamcinolone 0.1% cream BID PRN  - Referral to GI to re-establish care for ulcerative colitis    Procedures Performed:    None    Follow-up: 4 month(s) virtually (telephone with photos), or earlier for new or changing lesions    Staff and Scribe:     Scribe Disclosure:  ICristina, am serving as a scribe to document services personally performed by Chaka Fong MD based on data collection and the provider's statements to me.     Provider Disclosure:   The documentation recorded by the scribe accurately reflects the services I personally performed and the decisions made by me.    Chaka Fong MD    Department of Dermatology  [facility]: Phone: [phone], Fax:[phone]  [facility]: Phone: [phone] Fax: [phone]  ____________________________________________    CC: Follow Up (Psoriasis )    HPI:  Mr. Robert Shoemaker is a(n) 37 year old male who presents today as a return patient for psoriasis vulgaris. Last seen in dermatology on 10/11/21 by Dr. Kolb, at which time patient was continued on adalimumab for psoriasis in setting of history of ulcerative colitis.     Today, patient reports flare of  his psoriasis. He switched insurance and has been off Humira for about 6 months. Reports plaques on trunk and extremities. Does endorse some joint pain in the knees; reports this is worse since off of Humira but has never been formally diagnosed with psoriatic arthritis. He has has history of ulcerative colitis and has noticed some increased diarrhea when off adalimumab. Last colonoscopy in 2021. He is not following up with GI regularly.     Patient is otherwise feeling well, without additional skin concerns.    Labs Reviewed:  N/A    Physical Exam:  Vitals: There were no vitals taken for this visit.  SKIN: Teledermatology photos were reviewed; image quality and interpretability: acceptable. Image date: 2/23/23.  - Scattered pink psoriasiform appearing scaly plaques on trunk and extremities including elbows and knees, 10-12% BSA involvement.   - No other lesions of concern on areas examined.                 Medications:  Current Outpatient Medications   Medication     adalimumab (HUMIRA *CF* PEN) 40 MG/0.4ML pen kit     diltiazem 2% in lipovan cream 2% GEL     No current facility-administered medications for this visit.      Past Medical/Surgical History:   There is no problem list on file for this patient.    Past Medical History:   Diagnosis Date     Psoriasis

## 2023-02-23 NOTE — LETTER
2/23/2023       RE: Robert Shoemaker  [address] [address]     Dear Colleague,    Thank you for referring your patient, Robert Shoemaker, to the [facility]MATOLOG[facility] [place] at [facility]. Please see a copy of my visit note below.    [facility] Dermatology Note  Encounter Date: Feb 23, 2023  Telephone ([phone] ). Location of teledermatologist: [facility] [place]. Start time: 11:19 AM. End time: 11:30 AM.    Dermatology Problem List:  1. Plaque psoriasis +/- PsA in a patient with ulcerative colitis; biopsy-proven  - adalimumab; triam 0.1% cream BID PRN  - insurance did not cover ustekinumab  ____________________________________________    Assessment & Plan:     # Plaque psoriasis +/- PsA. Chronic, flare.   # Ulcerative colitis. Chronic, flare.   - Labs: quant gold  - Plan to restart adalimumab pending insurance approval with loading dose  - Start triamcinolone 0.1% cream BID PRN  - Referral to GI to re-establish care for ulcerative colitis    Procedures Performed:    None    Follow-up: 4 month(s) virtually (telephone with photos), or earlier for new or changing lesions    Staff and Scribe:     Scribe Disclosure:  ICristina, am serving as a scribe to document services personally performed by Chaka Fong MD based on data collection and the provider's statements to me.     Provider Disclosure:   The documentation recorded by the scribe accurately reflects the services I personally performed and the decisions made by me.    Chaka Fong MD    Department of Dermatology  [facility]: Phone: [phone], Fax:[phone]  [facility]: Phone: [phone] Fax: [phone]  ____________________________________________    CC: Follow Up  (Psoriasis )    HPI:  Mr. Robert Shoemaker is a(n) 37 year old male who presents today as a return patient for psoriasis vulgaris. Last seen in dermatology on 10/11/21 by Dr. Kolb, at which time patient was continued on adalimumab for psoriasis in setting of history of ulcerative colitis.     Today, patient reports flare of his psoriasis. He switched insurance and has been off Humira for about 6 months. Reports plaques on trunk and extremities. Does endorse some joint pain in the knees; reports this is worse since off of Humira but has never been formally diagnosed with psoriatic arthritis. He has has history of ulcerative colitis and has noticed some increased diarrhea when off adalimumab. Last colonoscopy in 2021. He is not following up with GI regularly.     Patient is otherwise feeling well, without additional skin concerns.    Labs Reviewed:  N/A    Physical Exam:  Vitals: There were no vitals taken for this visit.  SKIN: Teledermatology photos were reviewed; image quality and interpretability: acceptable. Image date: 2/23/23.  - Scattered pink psoriasiform appearing scaly plaques on trunk and extremities including elbows and knees, 10-12% BSA involvement.   - No other lesions of concern on areas examined.                 Medications:  Current Outpatient Medications   Medication     adalimumab (HUMIRA *CF* PEN) 40 MG/0.4ML pen kit     diltiazem 2% in lipovan cream 2% GEL     No current facility-administered medications for this visit.      Past Medical/Surgical History:   There is no problem list on file for this patient.    Past Medical History:   Diagnosis Date     Psoriasis

## 2023-02-23 NOTE — NURSING NOTE
Chief Complaint   Patient presents with     Follow Up     Psoriasis      Teledermatology Nurse Call Patients:     Are you in the [facility] at the time of the encounter? yes    Today's visit will be billed to you and your insurance.    A teledermatology visit is not as thorough as an in-person visit and the quality of the photograph sent may not be of the same quality as that taken by the dermatology clinic.    Shelby Kocher

## 2023-03-02 ENCOUNTER — MYC MEDICAL ADVICE (OUTPATIENT)
Dept: PHARMACY | Facility: OTHER | Age: 38
End: 2023-03-02
Payer: COMMERCIAL

## 2023-03-02 NOTE — TELEPHONE ENCOUNTER
Prior Authorization Approval    Authorization Effective Date: 3/1/2023  Authorization Expiration Date: 3/1/2024  Medication: Humira  Approved Dose/Quantity: 3 per 28 days  Reference #: Key: [id number]   Insurance Company: [employer] [place] - Phone [phone] Fax [phone]  Expected CoPay: $6615.75     CoPay Card Available: Yes    Foundation Assistance Needed:    Which Pharmacy is filling the prescription (Not needed for infusion/clinic administered): [facility] - [place], [place] - [address]  Pharmacy Notified: Yes  Patient Notified: Yes

## 2023-03-29 NOTE — TELEPHONE ENCOUNTER
my[employer]bb[employer] Assist patient assistance form signed by Dr. Fong and sent to Laureen Holcomb via email.    Paulie Vega, EMT

## 2023-04-30 ENCOUNTER — HEALTH MAINTENANCE LETTER (OUTPATIENT)
Age: 38
End: 2023-04-30

## 2023-06-28 ENCOUNTER — TELEPHONE (OUTPATIENT)
Dept: DERMATOLOGY | Facility: CLINIC | Age: 38
End: 2023-06-28
Payer: COMMERCIAL

## 2023-06-28 NOTE — TELEPHONE ENCOUNTER
LVM for pt to upload dermatology photos prior to telephone visit. Upload into [employer] or   email to us at: [email or web address]. Instructions have been sent to pt's mychart.

## 2023-06-29 ENCOUNTER — VIRTUAL VISIT (OUTPATIENT)
Dept: DERMATOLOGY | Facility: CLINIC | Age: 38
End: 2023-06-29
Payer: COMMERCIAL

## 2023-06-29 DIAGNOSIS — L40.0 PLAQUE PSORIASIS: Primary | ICD-10-CM

## 2023-06-29 DIAGNOSIS — K51.00 ULCERATIVE PANCOLITIS WITHOUT COMPLICATION (H): ICD-10-CM

## 2023-06-29 DIAGNOSIS — L50.1 CHRONIC IDIOPATHIC URTICARIA: ICD-10-CM

## 2023-06-29 PROCEDURE — 99214 OFFICE O/P EST MOD 30 MIN: CPT | Mod: 93 | Performed by: DERMATOLOGY

## 2023-06-29 NOTE — PROGRESS NOTES
[facility] Dermatology Note  Encounter Date: Jun 29, 2023  Store-and-Forward and Telephone [phone] . Location of teledermatologist: [facility] [place].  Start time: 11:11 AM. End time: 11:22 AM.    Dermatology Problem List:  1. Plaque psoriasis +/- PsA in a patient with ulcerative colitis; biopsy-proven  - adalimumab; triam 0.1% cream BID PRN  - insurance did not cover ustekinumab    ____________________________________________    Assessment & Plan:     # Plaque psoriasis +/- PsA. Chronic, stable/improed.   # Ulcerative colitis. Chronic, stable/improved.  - Continue Humira  - Can continue triamcinolone 0.1% cream BID PRN for flares  - Follow-up with GI in July as planned.     # Chronic idiopathic urticaria. Chronic, flare.   - Start cetirizine 10 mg PO qdaily; can increase up to 20 mg PO qdaily if needed.  - Lab work-up if not improving    Procedures Performed:    None    Follow-up: 9 month(s) in-person, or earlier for new or changing lesions    Staff:     Chaka Fong MD  Pronouns: he/him/his    Department of Dermatology  [facility]: Phone: [phone], Fax:[phone]  [facility]: Phone: [phone] Fax: [phone]  ____________________________________________    CC: RECHECK    HPI:  Mr. Robert Shoemaker is a(n) 37 year old male who presents today as a return patient for 2/23/23 for follow-up psoriasis. Last seen 4 months ago when restarted humira. Also referred to GI given history of ulcerative colitis (scheduled 7/26/23).     He reports psoriasis is improved from prior. Has been taking Humira injections regularly without side effects and not using topical therapies. Has appointment with GI in July.     He also reports a new rash on the trunk and extremities. Reports this is an intermittent, itchy rash lasting about 2 hours per  day. No known triggers or exacerbating events. Does respond well to benadryl taken orally. He reports no prior history of hives, seasonal allergies, or asthma.     Patient is otherwise feeling well, without additional skin concerns.    Labs Reviewed:  N/A    Physical Exam:  Vitals: There were no vitals taken for this visit.  SKIN: Teledermatology photos were reviewed; image quality and interpretability: acceptable. Image date: 6/29/23.  - Pink to hyperpigmented patches on upper and lower extremities.   - No other lesions of concern on areas examined.                   Medications:  Current Outpatient Medications   Medication     adalimumab (HUMIRA *CF* PEN) 40 MG/0.4ML pen kit     adalimumab (HUMIRA *CF*) 40 MG/0.4ML pen kit     adalimumab (HUMIRA *CF*) 80 MG/0.8ML pen kit     diltiazem 2% in lipovan cream 2% GEL     triamcinolone (KENALOG) 0.1 % external cream     No current facility-administered medications for this visit.      Past Medical/Surgical History:   Patient Active Problem List   Diagnosis     Ulcerative colitis (H)     Past Medical History:   Diagnosis Date     Psoriasis

## 2023-06-29 NOTE — LETTER
6/29/2023       RE: Robert Shoemaker  [address] [facility] [address]     Dear Colleague,    Thank you for referring your patient, Robert Shoemaker, to the [facility] DERMATOLOGY CLINIC [place] at [facility]. Please see a copy of my visit note below.    [facility] Dermatology Note  Encounter Date: Jun 29, 2023  Store-and-Forward and Telephone [phone] . Location of teledermatologist: [facility]ER[facility] [place].  Start time: 11:11 AM. End time: 11:22 AM.    Dermatology Problem List:  1. Plaque psoriasis +/- PsA in a patient with ulcerative colitis; biopsy-proven  - adalimumab; triam 0.1% cream BID PRN  - insurance did not cover ustekinumab    ____________________________________________    Assessment & Plan:     # Plaque psoriasis +/- PsA. Chronic, stable/improed.   # Ulcerative colitis. Chronic, stable/improved.  - Continue Humira  - Can continue triamcinolone 0.1% cream BID PRN for flares  - Follow-up with GI in July as planned.     # Chronic idiopathic urticaria. Chronic, flare.   - Start cetirizine 10 mg PO qdaily; can increase up to 20 mg PO qdaily if needed.  - Lab work-up if not improving    Procedures Performed:    None    Follow-up: 9 month(s) in-person, or earlier for new or changing lesions    Staff:     Chaka Fong MD  Pronouns: he/him/his    Department of Dermatology  [facility]: Phone: [phone], Fax:[phone]  [facility]: Phone: [phone] Fax: [phone]  ____________________________________________    CC: RECHECK    HPI:  Mr. Robert Shoemaker is a(n) 37 year old male who presents today as a return patient for 2/23/23 for follow-up psoriasis. Last seen 4 months ago when restarted humira. Also referred to GI given history of ulcerative colitis (scheduled  7/26/23).     He reports psoriasis is improved from prior. Has been taking Humira injections regularly without side effects and not using topical therapies. Has appointment with GI in July.     He also reports a new rash on the trunk and extremities. Reports this is an intermittent, itchy rash lasting about 2 hours per day. No known triggers or exacerbating events. Does respond well to benadryl taken orally. He reports no prior history of hives, seasonal allergies, or asthma.     Patient is otherwise feeling well, without additional skin concerns.    Labs Reviewed:  N/A    Physical Exam:  Vitals: There were no vitals taken for this visit.  SKIN: Teledermatology photos were reviewed; image quality and interpretability: acceptable. Image date: 6/29/23.  - Pink to hyperpigmented patches on upper and lower extremities.   - No other lesions of concern on areas examined.                   Medications:  Current Outpatient Medications   Medication    adalimumab (HUMIRA *CF* PEN) 40 MG/0.4ML pen kit    adalimumab (HUMIRA *CF*) 40 MG/0.4ML pen kit    adalimumab (HUMIRA *CF*) 80 MG/0.8ML pen kit    diltiazem 2% in lipovan cream 2% GEL    triamcinolone (KENALOG) 0.1 % external cream     No current facility-administered medications for this visit.      Past Medical/Surgical History:   Patient Active Problem List   Diagnosis    Ulcerative colitis (H)     Past Medical History:   Diagnosis Date    Psoriasis

## 2023-06-29 NOTE — NURSING NOTE
Teledermatology Nurse Call Patients:     Are you in the [facility] at the time of the encounter? yes    Today's visit will be billed to you and your insurance.    A teledermatology visit is not as thorough as an in-person visit and the quality of the photograph sent may not be of the same quality as that taken by the dermatology clinic.    Chief Complaint   Patient presents with     JACKIE Beckman

## 2023-06-29 NOTE — PATIENT INSTRUCTIONS
[facility] Dermatology Visit    Thank you for allowing us to participate in your care. Your findings, instructions and follow-up plan are as follows:          When should I call my doctor?  If you are worsening or not improving, please, contact us or seek urgent care as noted below.     Who should I call with questions (adults)?  [facility] (adult and pediatric): [phone]  [facility] (adult): [phone]  For urgent needs outside of business hours call the [facility] at [phone] and ask for the dermatology resident on call  If this is a medical emergency and you are unable to reach an ER, Call 911    Who should I call with questions (pediatric)?  [facility]- Pediatric Dermatology  Dr. She Nieves, Dr. Alicia Sampson, Dr. Gila Ortiz, Estephania Rocha, [place]  Dr. Deborah Shelby, Dr. Autumn Santamaria & Dr. Chris Nugent  Non Urgent  Nurse Triage Line; [phone]- Tabatha and Bernie RN Care Coordinators   Wendy (/Complex ) [phone]    If you need a prescription refill, please contact your pharmacy. Refills are approved or denied by our physicians during normal business hours, Monday through Fridays  Per office policy, refills will not be granted if you have not been seen within the past year (or sooner depending on your child's condition).    Scheduling Information:  Pediatric Appointment Scheduling and Call Center [phone]  Radiology Scheduling- [phone]  Sedation Unit Scheduling- [phone]  [place] Scheduling- General [phone]; Pediatric Dermatology [phone]  Main  Services: [phone]  Persian: [phone]  Libyan: [phone]  Hmong/Lithuanian/Greek: [phone]  Preadmission Nursing Department Fax Number: [phone] (fax all pre-operative paperwork to this number)    For urgent matters arising during evenings, weekends, or  holidays that cannot wait for normal business hours please call [phone] and ask for the dermatology resident on call to be paged.

## 2023-07-03 ENCOUNTER — TELEPHONE (OUTPATIENT)
Dept: DERMATOLOGY | Facility: CLINIC | Age: 38
End: 2023-07-03
Payer: COMMERCIAL

## 2023-07-03 NOTE — TELEPHONE ENCOUNTER
Lvm  for the patient to call back and schedule the following:    Appointment type: Return  Provider: Estephania Rocha   Return date: February 2024  Specialty phone number: [phone]

## 2023-12-08 ENCOUNTER — PHARMACY VISIT (OUTPATIENT)
Dept: ADMINISTRATIVE | Facility: CLINIC | Age: 38
End: 2023-12-08
Payer: COMMERCIAL

## 2023-12-08 NOTE — PROGRESS NOTES
Psoriasis Clinical Follow Up Assessment   Summary Notes   Unable to reach pt after 2 call cycles for TM.   Pt set up order for Humira via text to del 12/13/23. PDC: 0.93.    - TM specialty pharmacy will continue to reach out annually via text for TM assessments.         Care Details    What are the patient's goals for this therapy?   ? 4/11/23: To clear skin      Is patient meeting treatment goals?   ? Not appropriate to assess at this time      Please enter patient's PDC. [QA Metric]   ? 0.93      Based on the patient's report or refill record over the last 6-12 months, does the patient appear to be taking less than 80% of their medication? [QA Metric]   ? No      Please select CURRENT side effect(s) for monitoring:   ? None     Magaly Prince, Pharm.D.[facility]  [address] [address]  [email or web address]  Office: [phone]        4 or more times a week

## 2023-12-20 ENCOUNTER — TELEPHONE (OUTPATIENT)
Dept: DERMATOLOGY | Facility: CLINIC | Age: 38
End: 2023-12-20
Payer: COMMERCIAL

## 2023-12-20 NOTE — TELEPHONE ENCOUNTER
[facility] Call Center    Phone Message    May a detailed message be left on voicemail: yes     Reason for Call: Other: pt has a question regarding his Rx.  Pt pulled the pen out and all the meds went on the floor - what should pt do? Please call pt back to discuss. Thanks     Action Taken: Message routed to:  [facility] ([facility]): Derm    Travel Screening: Not Applicable

## 2023-12-21 NOTE — TELEPHONE ENCOUNTER
Called and discussed with patient. He states when he injected the medication, all of the medication came out and went on the floor, which he says has never happened before. He does not think that any of the medication was absorbed. Suggested patient call Humira for replacement, and they can contact the clinic if anything is needed further.     Also scheduled for 9 month follow up. Patient stated he would call if anything else is needed.

## 2023-12-29 DIAGNOSIS — L40.0 PSORIASIS VULGARIS: ICD-10-CM

## 2023-12-29 DIAGNOSIS — K51.00 CHRONIC ULCERATIVE ENTEROCOLITIS WITHOUT COMPLICATION (H): ICD-10-CM

## 2024-01-03 NOTE — TELEPHONE ENCOUNTER
adalimumab (HUMIRA *CF*) 40 MG/0.4ML pen kit   0.8 mL 11 2/23/2023     Last Office Visit : 6-  Future Office visit:  2-

## 2024-01-08 ENCOUNTER — TELEPHONE (OUTPATIENT)
Dept: DERMATOLOGY | Facility: CLINIC | Age: 39
End: 2024-01-08
Payer: COMMERCIAL

## 2024-01-08 DIAGNOSIS — L40.0 PLAQUE PSORIASIS: Primary | ICD-10-CM

## 2024-01-08 NOTE — TELEPHONE ENCOUNTER
[facility] Call Center    Phone Message    May a detailed message be left on voicemail: yes     Reason for Call: Medication Refill Request    Has the patient contacted the pharmacy for the refill? Yes   Name of medication being requested: adalimumab (HUMIRA *CF*) 40 MG/0.4ML pen kit    Provider who prescribed the medication: [facility]  Pharmacy: [facility]  # [phone] ext 1 ext 1  Fax# [phone]  Date medication is needed: Now    Pharmacy states there is a shortage of single dose pens so is asking for Rx to be for 2 packs    Action Taken: Message routed to:  [facility] Derm    Travel Screening: Not Applicable

## 2024-01-11 NOTE — TELEPHONE ENCOUNTER
Called patient to clarify Humira refill request - he requested I call him next Monday to check in instead. Will add to schedule and send refills at that time.     So Guillaume, Pharm.D.  Medication Therapy Management Pharmacist   [facility] Dermatology  [facility] Scheduling Line: [phone]

## 2024-01-16 ENCOUNTER — TELEPHONE (OUTPATIENT)
Dept: DERMATOLOGY | Facility: CLINIC | Age: 39
End: 2024-01-16

## 2024-01-16 NOTE — TELEPHONE ENCOUNTER
MTM appointment no showed, we made one more attempt to reschedule.     Routing back to referring provider and MTM pharmacist.       Leelee Guillen CPhT  MTM

## 2024-02-05 DIAGNOSIS — L40.0 PSORIASIS VULGARIS: ICD-10-CM

## 2024-02-05 DIAGNOSIS — K51.00 CHRONIC ULCERATIVE ENTEROCOLITIS WITHOUT COMPLICATION (H): ICD-10-CM

## 2024-02-09 NOTE — TELEPHONE ENCOUNTER
adalimumab (HUMIRA *CF*) 40 MG/0.4MG  Last Written Prescription Date:  2/23/23  Last Fill Quantity: 0.8ML,   # refills: 11  Last Office Visit : 6/29/23  Future Office visit:  2/13/24  Routing refill request to provider for review/approval because:  Drug not on the refill protocol

## 2024-03-05 DIAGNOSIS — L40.0 PSORIASIS VULGARIS: ICD-10-CM

## 2024-03-05 DIAGNOSIS — K51.00 CHRONIC ULCERATIVE ENTEROCOLITIS WITHOUT COMPLICATION (H): ICD-10-CM

## 2024-03-12 NOTE — TELEPHONE ENCOUNTER
adalimumab (HUMIRA *CF*) 40 MG/0.4ML pen kit   0.8 mL 11 2/23/2023     Last Office Visit : 6-  Future Office visit:  none

## 2024-03-15 ENCOUNTER — TELEPHONE (OUTPATIENT)
Dept: DERMATOLOGY | Facility: CLINIC | Age: 39
End: 2024-03-15
Payer: COMMERCIAL

## 2024-03-15 NOTE — TELEPHONE ENCOUNTER
Per visit with Dr. Fong on 6/29/23, continue Humira with request to follow up in 9 months. Due for follow up. Will authorize 2 months of refills and request patient schedule Derm follow-up via Service Management [employer]. Patient has already received letter from clinic.

## 2024-03-15 NOTE — TELEPHONE ENCOUNTER
PA Initiation    Medication: HUMIRA *CF* PEN 40 MG/0.4ML SC PNKT  Insurance Company: [facility] - Phone [phone] Fax [phone]  Pharmacy Filling the Rx: [facility] - [place], [place] - [address]  Filling Pharmacy Phone:    Filling Pharmacy Fax:    Start Date: 3/15/2024      Key: [id number]

## 2024-03-18 NOTE — TELEPHONE ENCOUNTER
Prior Authorization Approval    Medication: HUMIRA *CF* PEN 40 MG/0.4ML SC PNKT  Authorization Effective Date: 2/14/2024  Authorization Expiration Date: 3/15/2025  Approved Dose/Quantity: 2 for 28 days  Reference #: Key: [id number]   Insurance Company: [employer] [place] - Phone [phone] Fax [phone]  Expected CoPay: $ 5  CoPay Card Available: No    Financial Assistance Needed: no  Which Pharmacy is filling the prescription: [facility] - [place], [place] - [address]   Pharmacy Notified: yes  Patient Notified: yes

## 2024-04-09 ENCOUNTER — MYC MEDICAL ADVICE (OUTPATIENT)
Dept: DERMATOLOGY | Facility: CLINIC | Age: 39
End: 2024-04-09
Payer: COMMERCIAL

## 2024-04-10 ENCOUNTER — TELEPHONE (OUTPATIENT)
Dept: DERMATOLOGY | Facility: CLINIC | Age: 39
End: 2024-04-10

## 2024-04-10 ENCOUNTER — VIRTUAL VISIT (OUTPATIENT)
Dept: DERMATOLOGY | Facility: CLINIC | Age: 39
End: 2024-04-10
Payer: COMMERCIAL

## 2024-04-10 DIAGNOSIS — L40.0 PSORIASIS VULGARIS: ICD-10-CM

## 2024-04-10 DIAGNOSIS — L40.0 PLAQUE PSORIASIS: Primary | ICD-10-CM

## 2024-04-10 DIAGNOSIS — K51.00 CHRONIC ULCERATIVE ENTEROCOLITIS WITHOUT COMPLICATION (H): ICD-10-CM

## 2024-04-10 DIAGNOSIS — K51.90 ULCERATIVE COLITIS WITHOUT COMPLICATIONS, UNSPECIFIED LOCATION (H): ICD-10-CM

## 2024-04-10 PROCEDURE — 99214 OFFICE O/P EST MOD 30 MIN: CPT | Mod: 93 | Performed by: DERMATOLOGY

## 2024-04-10 RX ORDER — TRIAMCINOLONE ACETONIDE 1 MG/G
CREAM TOPICAL 2 TIMES DAILY
Qty: 80 G | Refills: 1 | Status: SHIPPED | OUTPATIENT
Start: 2024-04-10

## 2024-04-10 RX ORDER — TRIAMCINOLONE ACETONIDE 1 MG/G
OINTMENT TOPICAL 2 TIMES DAILY
Qty: 80 G | Refills: 1 | Status: SHIPPED | OUTPATIENT
Start: 2024-04-10

## 2024-04-10 RX ORDER — CLOBETASOL PROPIONATE 0.5 MG/ML
SOLUTION TOPICAL 2 TIMES DAILY
Qty: 50 ML | Refills: 3 | Status: SHIPPED | OUTPATIENT
Start: 2024-04-10

## 2024-04-10 ASSESSMENT — PAIN SCALES - GENERAL: PAINLEVEL: SEVERE PAIN (7)

## 2024-04-10 NOTE — TELEPHONE ENCOUNTER
PA Initiation    Medication: HUMIRA *CF* PEN 40 MG/0.4ML SC PNKT  Insurance Company: [facility] - Phone [phone] Fax [phone]  Pharmacy Filling the Rx: [facility] - [place], [place] - [address]  Filling Pharmacy Phone:    Filling Pharmacy Fax:    Start Date: 4/10/2024    Key: [id number]

## 2024-04-10 NOTE — PROGRESS NOTES
Per provider request and see office visit from 4/10/24 to increase to weekly Humira (adalimumab) dosing.  MTM placed order and will follow up with pharmacy liaison team to update PA.    Kerrie Wilson, PharmD, BCPPS  Medication Therapy Management Pharmacist  [facility]

## 2024-04-10 NOTE — PROGRESS NOTES
[facility] Dermatology Note  Encounter Date: Apr 10, 2024  Store-and-Forward and Telephone ([phone]). Location of teledermatologist: [facility] DERMATOLOGY CLINIC [place].  Start time: 8:51 am. End time: 9:02 am.    Dermatology Problem List:  1. Plaque psoriasis +/- PsA in a patient with ulcerative colitis; biopsy-proven  - Current tx: Humira, triamcinolone, clobetasol  - insurance did not cover ustekinumab    ____________________________________________    Assessment & Plan:    # Plaque psoriasis +/- PsA. Chronic, active, flaring.  # Ulcerative colitis.   Pt on humira but still active. Was out of humira for a bit which could have also contributed to flare. Hasn't seen GI recently.  - Increase Humira to weekly  - Start triamcinolone 0.1% cream BID prn for hands, knees,   - Start triamcinolone 0.1% ointment BID prn for buttocks  - Start clobetasol 0.5% solution BID prn for scalp  - Referred to GI to establish care      Procedures Performed:   None.    Follow-up: 3 month(s) in-person, or earlier for new or changing lesions    Staff and Scribe:     Scribe Disclosure:   I, AUDREY ROSADO, am serving as a scribe; to document services personally performed by Eva Root MD -based on data collection and the provider's statements to me.    Provider Disclosure:   The documentation recorded by the scribe accurately reflects the services I personally performed and the decisions made by me.    Eva Root MD    Department of Dermatology  [facility]: Phone: [phone], Fax: [phone]  4/17/2024     ____________________________________________    CC: Derm Problem (Patient reports that their psoriasis is worsening. The patient reports knees, elbows, head, and buttocks have the most flaring. )    HPI:  Mr. Robert Shoemaker is a(n) 38 year old male who presents today as a return  patient for psoriasis.     The patient has been taking Humira for psoriasis. Has been worsening. Knees, elbows, head, and buttocks flaring. Buttocks is the worst, close to anal opening per patient. It is painful for him to use the bathroom. Pain keeps him up at night.     Has has issues with insurance as he is behind by 1 week with injections. Unsure if related to flaring.    Hx of ulcerative colitis.    Patient is otherwise feeling well, without additional skin concerns.    Labs Reviewed:  N/A    Physical Exam:  Vitals: There were no vitals taken for this visit.  SKIN: Teledermatology photos were reviewed; image quality and interpretability: acceptable.   - Scaly pink plaques on elbows, dorsal hands  - No other lesions of concern on areas examined.     Medications:  Current Outpatient Medications   Medication Sig Dispense Refill    adalimumab (HUMIRA *CF* PEN) 40 MG/0.4ML pen kit Inject 0.4 mLs (40 mg) Subcutaneous every 14 days 0.8 mL 11    adalimumab (HUMIRA *CF*) 40 MG/0.4ML pen kit Inject 0.4 mLs (40 mg) Subcutaneous every 14 days 0.8 mL 1    adalimumab (HUMIRA *CF*) 80 MG/0.8ML pen kit Inject 0.8 mL (80 mg) subcutaneous for 1 dose, followed by 40 mg every other week beginning 1 week after initial dose (see separate order) 0.8 mL 0    diltiazem 2% in lipovan cream 2% GEL Apply 1 g topically 3 times daily To external anal opening for 8 weeks (Patient not taking: Reported on 6/29/2023) 90 g 1    triamcinolone (KENALOG) 0.1 % external cream Apply twice daily as needed for psoriasis on trunk and extremities (Patient not taking: Reported on 6/29/2023) 454 g 11     No current facility-administered medications for this visit.      Past Medical History:   Patient Active Problem List   Diagnosis    Ulcerative colitis (H)     Past Medical History:   Diagnosis Date    Psoriasis         CC Referred Self, MD  No address on file on close of this encounter.

## 2024-04-10 NOTE — NURSING NOTE
Chief Complaint   Patient presents with    Derm Problem     Patient reports that their psoriasis is worsening. The patient reports knees, elbows, head, and buttocks have the most flaring.      Georgina Lopez LPN

## 2024-04-10 NOTE — LETTER
4/10/2024       RE: Robert Shoemaker  [address] [facility] [place] [facility] [address]     Dear Colleague,    Thank you for referring your patient, Robert Shoemaker, to the [facility] DERMATOLOGY [facility]INIC [place] at [facility]. Please see a copy of my visit note below.    [facility] Dermatology Note  Encounter Date: Apr 10, 2024  Store-and-Forward and Telephone ([phone]). Location of teledermatologist: [facility]ER[facility]OLOG[facility] [place].  Start time: 8:51 am. End time: 9:02 am.    Dermatology Problem List:  1. Plaque psoriasis +/- PsA in a patient with ulcerative colitis; biopsy-proven  - Current tx: Humira, triamcinolone, clobetasol  - insurance did not cover ustekinumab    ____________________________________________    Assessment & Plan:    # Plaque psoriasis +/- PsA. Chronic, active, flaring.  # Ulcerative colitis.   Pt on humira but still active. Was out of humira for a bit which could have also contributed to flare. Hasn't seen GI recently.  - Increase Humira to weekly  - Start triamcinolone 0.1% cream BID prn for hands, knees,   - Start triamcinolone 0.1% ointment BID prn for buttocks  - Start clobetasol 0.5% solution BID prn for scalp  - Referred to GI to establish care      Procedures Performed:   None.    Follow-up: 3 month(s) in-person, or earlier for new or changing lesions    Staff and Scribe:     Scribe Disclosure:   I, AUDREY ROSADO, am serving as a scribe; to document services personally performed by Eva Root MD -based on data collection and the provider's statements to me.    Provider Disclosure:   The documentation recorded by the scribe accurately reflects the services I personally performed and the decisions made by me.    Eva Root MD    Department of Dermatology  [facility]: Phone: [phone],  Fax: [phone]  4/17/2024     ____________________________________________    CC: Derm Problem (Patient reports that their psoriasis is worsening. The patient reports knees, elbows, head, and buttocks have the most flaring. )    HPI:  Mr. Robert Shoemaker is a(n) 38 year old male who presents today as a return patient for psoriasis.     The patient has been taking Humira for psoriasis. Has been worsening. Knees, elbows, head, and buttocks flaring. Buttocks is the worst, close to anal opening per patient. It is painful for him to use the bathroom. Pain keeps him up at night.     Has has issues with insurance as he is behind by 1 week with injections. Unsure if related to flaring.    Hx of ulcerative colitis.    Patient is otherwise feeling well, without additional skin concerns.    Labs Reviewed:  N/A    Physical Exam:  Vitals: There were no vitals taken for this visit.  SKIN: Teledermatology photos were reviewed; image quality and interpretability: acceptable.   - Scaly pink plaques on elbows, dorsal hands  - No other lesions of concern on areas examined.     Medications:  Current Outpatient Medications   Medication Sig Dispense Refill    adalimumab (HUMIRA *CF* PEN) 40 MG/0.4ML pen kit Inject 0.4 mLs (40 mg) Subcutaneous every 14 days 0.8 mL 11    adalimumab (HUMIRA *CF*) 40 MG/0.4ML pen kit Inject 0.4 mLs (40 mg) Subcutaneous every 14 days 0.8 mL 1    adalimumab (HUMIRA *CF*) 80 MG/0.8ML pen kit Inject 0.8 mL (80 mg) subcutaneous for 1 dose, followed by 40 mg every other week beginning 1 week after initial dose (see separate order) 0.8 mL 0    diltiazem 2% in lipovan cream 2% GEL Apply 1 g topically 3 times daily To external anal opening for 8 weeks (Patient not taking: Reported on 6/29/2023) 90 g 1    triamcinolone (KENALOG) 0.1 % external cream Apply twice daily as needed for psoriasis on trunk and extremities (Patient not taking: Reported on 6/29/2023) 454 g 11     No current facility-administered medications  for this visit.      Past Medical History:   Patient Active Problem List   Diagnosis    Ulcerative colitis (H)     Past Medical History:   Diagnosis Date    Psoriasis         CC Referred Self, MD  No address on file on close of this encounter.

## 2024-04-12 DIAGNOSIS — K51.00 CHRONIC ULCERATIVE ENTEROCOLITIS WITHOUT COMPLICATION (H): ICD-10-CM

## 2024-04-12 DIAGNOSIS — L40.0 PSORIASIS VULGARIS: ICD-10-CM

## 2024-04-12 NOTE — TELEPHONE ENCOUNTER
Prior Authorization Approval    Medication: HUMIRA *CF* PEN 40 MG/0.4ML SC PNKT  Authorization Effective Date: 3/12/2024  Authorization Expiration Date: 3/15/2025  Approved Dose/Quantity: 4 per 28 days   Reference #: Key: [id number]   Insurance Company: [employer] [place] - Phone [phone] Fax [phone]  Expected CoPay: $ 5  CoPay Card Available: No    Financial Assistance Needed: no  Which Pharmacy is filling the prescription: [facility] - [place], [place] - [address]   Pharmacy Notified: yes  Patient Notified:

## 2024-04-15 NOTE — TELEPHONE ENCOUNTER
See previous RX sent on 4/10 as well as office notes recently requesting weekly dosing.  PA approved

## 2024-05-05 RX ORDER — ADALIMUMAB 40MG/0.4ML
KIT SUBCUTANEOUS
Refills: 0 | OUTPATIENT
Start: 2024-05-05

## 2024-07-07 ENCOUNTER — HEALTH MAINTENANCE LETTER (OUTPATIENT)
Age: 39
End: 2024-07-07

## 2025-01-16 DIAGNOSIS — K51.00 CHRONIC ULCERATIVE ENTEROCOLITIS WITHOUT COMPLICATION (H): ICD-10-CM

## 2025-01-16 DIAGNOSIS — L40.0 PSORIASIS VULGARIS: ICD-10-CM

## 2025-01-22 ENCOUNTER — TELEPHONE (OUTPATIENT)
Dept: DERMATOLOGY | Facility: CLINIC | Age: 40
End: 2025-01-22
Payer: COMMERCIAL

## 2025-01-22 NOTE — TELEPHONE ENCOUNTER
1/22 Left Voicemail (1st Attempt) and Sent Mychart (1st Attempt) for the patient to call back and schedule the following:    Appointment type: Return Dermatology  Provider: Dk  Return date: next available   Specialty phone number: [phone]  Additional appointment(s) needed: n/a  Additonal Notes: MEDICATION REFILL

## 2025-01-22 NOTE — TELEPHONE ENCOUNTER
Last Written Prescription:    adalimumab (HUMIRA *CF*) 40 MG/0.4ML pen kit  40 mg, EVERY 7 DAYS           Summary: Inject 0.4 mLs (40 mg) Subcutaneous every 7 days, Disp-1.6 mL, R-2, E-Prescribe  Dose, Route, Frequency: 40 mg, Subcutaneous, EVERY 7 DAYSStart: 04/15/2024Ord/Sold: 04/15/2024 (O)Ordered On: 04/15/2024Pharmacy: [facility] - [place], [place] - [address]: Taking: Long-term: Med Note:              Patient Sig: Inject 0.4 mLs (40 mg) Subcutaneous every 7 days  Ordering Department: [facility] DERMATOLOGY  Authorized By: Eva Root MD  Dispense: 1.6 mL  Refills: 2 ordered       ----------------------  Last Visit Date: 4/10/24 Dk BREEN  Future Visit Date: None  ----------------------    Refill decision: Failed protocol, was to f/u in 3 months (7/2024) No pending visit  MTM to manage? No MTM seen        Lakisha VELEZ RN  P Central Nursing/Red Flag Triage & Med Refill Team

## 2025-01-23 DIAGNOSIS — L40.0 PSORIASIS VULGARIS: ICD-10-CM

## 2025-01-23 DIAGNOSIS — K51.00 CHRONIC ULCERATIVE ENTEROCOLITIS WITHOUT COMPLICATION (H): ICD-10-CM

## 2025-01-27 ENCOUNTER — TELEPHONE (OUTPATIENT)
Dept: DERMATOLOGY | Facility: CLINIC | Age: 40
End: 2025-01-27
Payer: COMMERCIAL

## 2025-01-27 NOTE — TELEPHONE ENCOUNTER
1/27 Patient confirmed scheduled appointment:  Date: 1/28/25  Time: 4:00pm  Visit type: Return Dermatology  Provider: Manny  Location: [facility]  Testing/imaging: NA  Additional notes: Medication Refill

## 2025-01-28 ENCOUNTER — LAB (OUTPATIENT)
Dept: LAB | Facility: CLINIC | Age: 40
End: 2025-01-28
Payer: COMMERCIAL

## 2025-01-28 ENCOUNTER — OFFICE VISIT (OUTPATIENT)
Dept: DERMATOLOGY | Facility: CLINIC | Age: 40
End: 2025-01-28
Payer: COMMERCIAL

## 2025-01-28 DIAGNOSIS — Z79.899 ENCOUNTER FOR LONG-TERM (CURRENT) USE OF HIGH-RISK MEDICATION: Primary | ICD-10-CM

## 2025-01-28 DIAGNOSIS — L40.0 PLAQUE PSORIASIS: ICD-10-CM

## 2025-01-28 DIAGNOSIS — Z79.899 ENCOUNTER FOR LONG-TERM (CURRENT) USE OF HIGH-RISK MEDICATION: ICD-10-CM

## 2025-01-28 PROCEDURE — 86481 TB AG RESPONSE T-CELL SUSP: CPT | Performed by: STUDENT IN AN ORGANIZED HEALTH CARE EDUCATION/TRAINING PROGRAM

## 2025-01-28 PROCEDURE — 36415 COLL VENOUS BLD VENIPUNCTURE: CPT | Performed by: PATHOLOGY

## 2025-01-28 PROCEDURE — 99000 SPECIMEN HANDLING OFFICE-LAB: CPT | Performed by: PATHOLOGY

## 2025-01-28 RX ORDER — ADALIMUMAB 40MG/0.4ML
40 KIT SUBCUTANEOUS
Qty: 2 EACH | Refills: 11 | Status: SHIPPED | OUTPATIENT
Start: 2025-01-28

## 2025-01-28 NOTE — NURSING NOTE
Dermatology Rooming Note    Robert Shoemaker's goals for this visit include:   Chief Complaint   Patient presents with    Psoriasis     Recheck- flares on knees, scalp and elbows. He reports overall improvement.     Sanket CHEN CMA - Dermatology

## 2025-01-28 NOTE — PROGRESS NOTES
[facility] Dermatology Note    Encounter Date: Jan 28, 2025    Dermatology Problem List:  1. Plaque psoriasis +/- PsA in a patient with ulcerative colitis; biopsy-proven  - Current tx: Humira, triamcinolone, clobetasol  - insurance did not cover ustekinumab    Major PMHx  -   ______________________________________    Impression/Plan:  Robert was seen today for psoriasis.    Diagnoses and all orders for this visit:    Encounter for long-term (current) use of high-risk medication  -     Quantiferon TB Gold Plus; Future  - last done 2020    Plaque psoriasis (Continuing focal point for medical care services related to serious/complex condition)  -     Adalimumab (HUMIRA, 2 PEN,) 40 MG/0.4ML pen kit; Inject 0.4 mLs (40 mg) subcutaneously every 14 days.  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       Staff Involved:  Staff Only    Harry Bryant MD   of Dermatology  Department of Dermatology  [facility]      CC:   Chief Complaint   Patient presents with    Psoriasis     Recheck- flares on knees, scalp and elbows. He reports overall improvement.       History of Present Illness:  Mr. Robert Shoemaker is a 39 year old male who presents as a return patient.    Doing well on humira, needs refills    Labs:      Physical exam:  Vitals: There were no vitals taken for this visit.  GEN: well developed, well-nourished, in no acute distress, in a pleasant mood.     SKIN: Olvera phototype 1  - Sun-exposed skin, which includes the head/face, neck, both arms, digits, and/or nails was examined.   - no psoriasiform plaques   - No other lesions of concern on areas examined.     Past Medical History:   Past Medical History:   Diagnosis Date    Psoriasis      Past Surgical History:   Procedure Laterality Date    AS KNEE SCOPE,MED/LAT  MENISCUS REPAIR      COLONOSCOPY N/A 6/28/2021    Procedure: COLONOSCOPY, WITH POLYPECTOMY AND BIOPSY;  Surgeon: Monica Ramos MD;  Location: [facility] OR    HERNIA REPAIR         Social History:   reports that he has never smoked. His smokeless tobacco use includes chew. He reports current alcohol use. He reports that he does not use drugs.    Family History:  Family History   Problem Relation Age of Onset    Hypertension Mother     Inflammatory Bowel Disease No family hx of     Autoimmune Disease No family hx of     Colon Cancer No family hx of        Medications:  Current Outpatient Medications   Medication Sig Dispense Refill    adalimumab (HUMIRA *CF*) 40 MG/0.4ML pen kit Inject 0.4 mLs (40 mg) Subcutaneous every 7 days 1.6 mL 2    Adalimumab (HUMIRA, 2 PEN,) 40 MG/0.4ML pen kit Inject 0.4 mLs (40 mg) subcutaneously every 14 days. 2 each 11    clobetasol (TEMOVATE) 0.05 % external solution Apply topically 2 times daily To scalp as needed 50 mL 3    diltiazem 2% in lipovan cream 2% GEL Apply 1 g topically 3 times daily To external anal opening for 8 weeks (Patient not taking: Reported on 6/29/2023) 90 g 1    triamcinolone (KENALOG) 0.1 % external cream Apply topically 2 times daily To psoriasis as needed (Patient not taking: Reported on 1/28/2025) 80 g 1    triamcinolone (KENALOG) 0.1 % external cream Apply twice daily as needed for psoriasis on trunk and extremities (Patient not taking: Reported on 6/29/2023) 454 g 11    triamcinolone (KENALOG) 0.1 % external ointment Apply topically 2 times daily To psoriasis as needed (Patient not taking: Reported on 1/28/2025) 80 g 1     No Known Allergies

## 2025-01-28 NOTE — LETTER
1/28/2025       RE: Robert Shoemaker  [address] [facility] [address]     Dear Colleague,    Thank you for referring your patient, Robert Shoemaker, to the [facility]ERMATOLOGY [facility] [place] at [facility]. Please see a copy of my visit note below.    [facility] Dermatology Note    Encounter Date: Jan 28, 2025    Dermatology Problem List:  1. Plaque psoriasis +/- PsA in a patient with ulcerative colitis; biopsy-proven  - Current tx: Humira, triamcinolone, clobetasol  - insurance did not cover ustekinumab    Major PMHx  -   ______________________________________    Impression/Plan:  Robert was seen today for psoriasis.    Diagnoses and all orders for this visit:    Encounter for long-term (current) use of high-risk medication  -     Quantiferon TB Gold Plus; Future  - last done 2020    Plaque psoriasis (Continuing focal point for medical care services related to serious/complex condition)  -     Adalimumab (HUMIRA, 2 PEN,) 40 MG/0.4ML pen kit; Inject 0.4 mLs (40 mg) subcutaneously every 14 days.  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       Staff Involved:  Staff Only    Harry Bryant MD   of Dermatology  Department of Dermatology  [facility]      CC:   Chief Complaint   Patient presents with     Psoriasis     Recheck- flares on knees, scalp and elbows. He reports overall improvement.       History of Present Illness:  Mr. Robert Shoemaker is a 39 year old male who presents as a return patient.    Doing well on humira, needs refills    Labs:      Physical exam:  Vitals: There were no vitals taken for this visit.  GEN: well developed, well-nourished, in no acute distress, in a pleasant mood.     SKIN: Olvera phototype 1  - Sun-exposed skin,  which includes the head/face, neck, both arms, digits, and/or nails was examined.   - no psoriasiform plaques   - No other lesions of concern on areas examined.     Past Medical History:   Past Medical History:   Diagnosis Date     Psoriasis      Past Surgical History:   Procedure Laterality Date     AS KNEE SCOPE,MED/LAT MENISCUS REPAIR       COLONOSCOPY N/A 6/28/2021    Procedure: COLONOSCOPY, WITH POLYPECTOMY AND BIOPSY;  Surgeon: Monica Ramos MD;  Location: [facility] OR     HERNIA REPAIR         Social History:   reports that he has never smoked. His smokeless tobacco use includes chew. He reports current alcohol use. He reports that he does not use drugs.    Family History:  Family History   Problem Relation Age of Onset     Hypertension Mother      Inflammatory Bowel Disease No family hx of      Autoimmune Disease No family hx of      Colon Cancer No family hx of        Medications:  Current Outpatient Medications   Medication Sig Dispense Refill     adalimumab (HUMIRA *CF*) 40 MG/0.4ML pen kit Inject 0.4 mLs (40 mg) Subcutaneous every 7 days 1.6 mL 2     Adalimumab (HUMIRA, 2 PEN,) 40 MG/0.4ML pen kit Inject 0.4 mLs (40 mg) subcutaneously every 14 days. 2 each 11     clobetasol (TEMOVATE) 0.05 % external solution Apply topically 2 times daily To scalp as needed 50 mL 3     diltiazem 2% in lipovan cream 2% GEL Apply 1 g topically 3 times daily To external anal opening for 8 weeks (Patient not taking: Reported on 6/29/2023) 90 g 1     triamcinolone (KENALOG) 0.1 % external cream Apply topically 2 times daily To psoriasis as needed (Patient not taking: Reported on 1/28/2025) 80 g 1     triamcinolone (KENALOG) 0.1 % external cream Apply twice daily as needed for psoriasis on trunk and extremities (Patient not taking: Reported on 6/29/2023) 454 g 11     triamcinolone (KENALOG) 0.1 % external ointment Apply topically 2 times daily To psoriasis as needed (Patient not taking: Reported on 1/28/2025) 80 g 1     No Known  Allergies              Again, thank you for allowing me to participate in the care of your patient.      Sincerely,    Harry Bryant MD

## 2025-01-29 ENCOUNTER — TELEPHONE (OUTPATIENT)
Dept: DERMATOLOGY | Facility: CLINIC | Age: 40
End: 2025-01-29
Payer: COMMERCIAL

## 2025-01-29 DIAGNOSIS — L40.0 PSORIASIS VULGARIS: ICD-10-CM

## 2025-01-29 DIAGNOSIS — K51.00 CHRONIC ULCERATIVE ENTEROCOLITIS WITHOUT COMPLICATION (H): ICD-10-CM

## 2025-01-29 LAB
QUANTIFERON MITOGEN: 10 IU/ML
QUANTIFERON NIL TUBE: 0 IU/ML
QUANTIFERON TB1 TUBE: 0 IU/ML
QUANTIFERON TB2 TUBE: 0.01

## 2025-01-30 LAB
GAMMA INTERFERON BACKGROUND BLD IA-ACNC: 0 IU/ML
M TB IFN-G BLD-IMP: NEGATIVE
M TB IFN-G CD4+ BCKGRND COR BLD-ACNC: 10 IU/ML
MITOGEN IGNF BCKGRD COR BLD-ACNC: 0 IU/ML
MITOGEN IGNF BCKGRD COR BLD-ACNC: 0.01 IU/ML

## 2025-01-30 NOTE — TELEPHONE ENCOUNTER
PA Initiation    Medication: HUMIRA (2 PEN) 40 MG/0.4ML SC AJKT  Insurance Company: [employer] - Phone [phone] Fax [phone]  [facility] [address]  Filling Pharmacy Phone:    Filling Pharmacy Fax:    Start Date: 1/29/2025      Submitted pa form to insurance with chart notes

## 2025-02-04 RX ORDER — ADALIMUMAB 40MG/0.4ML
KIT SUBCUTANEOUS
Refills: 2 | OUTPATIENT
Start: 2025-02-04

## 2025-02-04 NOTE — TELEPHONE ENCOUNTER
Prior Authorization Approval    Medication: HUMIRA (2 PEN) 40 MG/0.4ML SC AJKT  Authorization Effective Date: 1/31/2025  Authorization Expiration Date: 1/29/2026  Approved Dose/Quantity: 2 for 28 days  Reference #:     Insurance Company: [employer] - Phone [phone] Fax [phone]  Expected CoPay: $  unable to see copay  CoPay Card Available: No    Financial Assistance Needed:   Which Pharmacy is filling the prescription: [facility] [address]  Pharmacy Notified: yes  Patient Notified: not yet

## 2025-02-04 NOTE — TELEPHONE ENCOUNTER
Dose verified with Dr. Bryant to be Humira 40 mg once weekly. Prescription resent to requested pharmacy per liaison request. New Pa will likely need to be completed. Thank you!    Left voicemail for patient letting him know his pharmacy will likely be changing and we will send more information via [employer] once finalized. Also left [facility] scheduling line to coordinate our first visit (patient referred).     So Guillaume [facility]

## 2025-02-06 NOTE — TELEPHONE ENCOUNTER
MTM visit on 2/7/25. Please let me know if that is enough or if we need to write and appeal. Thanks!

## 2025-02-07 ENCOUNTER — VIRTUAL VISIT (OUTPATIENT)
Dept: PHARMACY | Facility: CLINIC | Age: 40
End: 2025-02-07
Attending: STUDENT IN AN ORGANIZED HEALTH CARE EDUCATION/TRAINING PROGRAM
Payer: COMMERCIAL

## 2025-02-07 DIAGNOSIS — L40.9 PSORIASIS: Primary | ICD-10-CM

## 2025-02-07 DIAGNOSIS — I10 HTN (HYPERTENSION): ICD-10-CM

## 2025-02-07 RX ORDER — LISINOPRIL 10 MG/1
1 TABLET ORAL DAILY
COMMUNITY
Start: 2025-01-21

## 2025-02-07 RX ORDER — PREDNISONE 20 MG/1
TABLET ORAL
COMMUNITY
Start: 2025-01-21 | End: 2025-02-11

## 2025-02-07 NOTE — PROGRESS NOTES
Medication Therapy Management (MTM) Encounter    ASSESSMENT:                            Medication Adherence/Access: No issues identified.    Psoriasis: Provided education on Humira (adalimumab) today including dosing, general administration, side effects (both common/serious), precautions, monitoring and time to efficacy. Encouraged indicated non-live vaccines and avoidance of live vaccines. Discussed potential need to hold therapy in the setting of signs/symptoms of active infection. Encouraged patient to contact the Dermatology clinic in the event they have questions. Would benefit from starting Humira once it arrives.    Hypertension: Defer to primary care provider for continued management of condition. Would benefit from follow-up with current reported blood pressures to assess if adjustments in regimen are appropriate; if additional therapy needed, recommend titrating lisinopril to effect prior to adding an alternate medication.     PLAN:                            Continue current regimen as prescribed.   Meet with primary care provider with current blood pressure measures; it may be necessary to increase your lisinopril to target resting blood pressures <130/80.   Consider receiving the following vaccination(s): COVID-19 booster, annual flu shot, pneumonia (PCV-20 preferred), shingles (Shingrix; 2 dose-series), and Hepatitis B series (based on labs from 12/16/20).    Follow-up: Derm (not scheduled; due in 1 year); MTM every 6 months and as needed (patient open to MTM calling when needed)     SUBJECTIVE/OBJECTIVE:                          Robert Shoemaker is a 39 year old male called for an initial visit. He was referred to me from Dr. Harry Bryant MD.      Reason for visit: Humira coverage.    Allergies/ADRs: Reviewed in chart and with patient.  Past Medical History: Reviewed in chart.  Tobacco: He reports that he has never smoked. His smokeless tobacco use includes chew. Chew tobacco -- every day 2 tubes per  week.   Alcohol: 2-3 drinks per day.    Medication Adherence/Access: no issues reported.  Humira coverage - PA in progress.    Psoriasis:   - Adalimumab (Humira) 40 mg/0.4 mL pen kit: Inject 40 mg subcutaneous every 7 days.  - Clobetasol 0.05% external solution twice daily as needed.  - Triamcinolone 0.1% cream and ointment: Apply topically twice daily as needed.  - Prednisone taper.     2/7/25: Patient reports effect of current regimen (previous symptoms improving) and denies adverse effects. Current symptoms include: some flaking and itch left on knees but overall clearing up (history of full clearance on Humira previously and patient just restarted this round in about 9/2024). Patient is not having troubles with administration. 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 Patient's questions/concerns at this time: None. Of note, patient has tried every 14 day Humira dosing in the past but was not adequately effective; also patient is improving in symptoms with current dosing.     Specialist: Dr. Harry Bryant MD, Dermatology. Last visit on 1/28/25. The following was recommended:   Plaque psoriasis (Continuing focal point for medical care services related to serious/complex condition)  -     Adalimumab (HUMIRA, 2 PEN,) 40 MG/0.4ML pen kit; Inject 0.4 mLs (40 mg) subcutaneously every 14 days.  -     Med Therapy Management Referral  - is a seasonal worker  - insurance only covers part of the year  - refill  - mtm referral for coverage questions  Follow-up in 1 year.     Pre-Biologic Screenings      Hep B Surface Antibody Non-reactive (12/16/20)    Hep B Core Antibody  Non-reactive (12/16/20)    Hep B Surface Antigen Non-reactive (12/16/20)    Hep C Antibody  Non-reactive (12/16/20)    HIV Antigen Antibody Non-reactive (12/16/20)    Quantiferon TB Gold Negative (1/28/25)    CBC 12/31/24   CMP 12/31/24 (eGFR >90 mL/min)     Immunization History   Covid-19  vaccine  Due to receive   Influenza (annual) Due to receive, avoid live FluMist   Tetanus/Tdap Up-to-date   Pneumococcal Eligible to receive with immunomodulator start   Shingrix (for ages 18-49 at increased risk AND >= 50 years old)  Eligible to receive with immunomodulator start   Hepatitis B vaccine Due to receive based on last serologies   All patients on biologics should avoid live vaccines (varicella/VZV, intranasal influenza, MMR, or yellow fever vaccine (if traveling))      Hypertension:  - Lisinopril 10 mg once daily.     Patient reports no current medication side effects. Patient self-monitors blood pressure.  Home BP monitoring every day; values range from 121/73 to 171/102 with an average of 135/92 from 14 recorded values (138/100, 121/73, 140/85, 145/92, 168/108, 145/88, 141/85, 142/88, 155/97, 132/83, 171/102, 142/94, 142/92, 154/100).    Leelee leal -- [facility] [place]   No AEs    Today's Vitals: There were no vitals taken for this visit.  ----------------  I spent 15 minutes with this patient today. All changes were made via collaborative practice agreement with Dr. Harry Bryant MD. A copy of the visit note was provided to the patient's provider(s).    A summary of these recommendations was sent via [employer].    So Guillaume, Pharm.D.  Medication Therapy Management Pharmacist   [facility] Dermatology    Telemedicine Visit Details  The patient's medications can be safely assessed via a telemedicine encounter.  Type of service:  Telephone visit  Originating Location (pt. Location): Home    Distant Location (provider location):  Off-site  Start Time: 11:40 AM - did not answer first call  End Time: 11:55 AM     Medication Therapy Recommendations  No medication therapy recommendations to display

## 2025-02-10 NOTE — PATIENT INSTRUCTIONS
"Recommendations from today's MTM visit:                                                    MTM (medication therapy management) is a service provided by a clinical pharmacist designed to help you get the most of out of your medicines.      Continue current regimen as prescribed.   Meet with primary care provider with current blood pressure measures; it may be necessary to increase your lisinopril to target resting blood pressures <130/80.   Consider receiving the following vaccination(s): COVID-19 booster, annual flu shot, pneumonia (PCV-20 preferred), shingles (Shingrix; 2 dose-series), and Hepatitis B series (based on labs from 12/16/20).    Follow-up: Derm (not scheduled; due in 1 year); MTM every 6 months and as needed (patient open to MTM calling when needed)     It was great speaking with you today.  I value your experience and would be very thankful for your time in providing feedback in our clinic survey. In the next few days, you may receive an email or text message from [employer] with a link to a survey related to your  clinical pharmacist.\"     To schedule another MTM appointment, please call the clinic directly or you may call the MTM scheduling line at [phone].    My Clinical Pharmacist's contact information:                                                      Please feel free to contact me with any questions or concerns you have.      So Guillaume, Pharm.D.  Medication Therapy Management Pharmacist   [facility] Dermatology  MTM Scheduling Line: [phone]    "

## 2025-02-26 NOTE — TELEPHONE ENCOUNTER
PRIOR AUTHORIZATION DENIED    Medication: HUMIRA (2 PEN) 40 MG/0.4ML SC AJKT  Insurance Company: [email or web address] - Phone [phone] Fax [phone]  Denial Date:    Denial Reason(s):   Appeal Information: [phone]

## 2025-07-13 ENCOUNTER — HEALTH MAINTENANCE LETTER (OUTPATIENT)
Age: 40
End: 2025-07-13

## 2025-09-02 ENCOUNTER — MYC MEDICAL ADVICE (OUTPATIENT)
Dept: PHARMACY | Facility: CLINIC | Age: 40
End: 2025-09-02
Payer: COMMERCIAL

## (undated) DEVICE — ENDO SNARE EXACTO COLD 9MM LOOP 2.4MMX230CM 00711115

## (undated) DEVICE — SPECIMEN CONTAINER 3OZ W/FORMALIN 59901

## (undated) DEVICE — SUCTION MANIFOLD NEPTUNE 2 SYS 1 PORT 702-025-000

## (undated) DEVICE — TUBING SUCTION 12"X1/4" N612

## (undated) DEVICE — ENDO TRAP POLYP E-TRAP 00711099

## (undated) DEVICE — GOWN IMPERVIOUS 2XL BLUE

## (undated) DEVICE — KIT ENDO TURNOVER/PROCEDURE CARRY-ON 101822

## (undated) DEVICE — ENDO FORCEP BX CAPTURA PRO SPIKE G50696

## (undated) DEVICE — SOL WATER IRRIG 500ML BOTTLE 2F7113

## (undated) RX ORDER — FENTANYL CITRATE 50 UG/ML
INJECTION, SOLUTION INTRAMUSCULAR; INTRAVENOUS
Status: DISPENSED
Start: 2021-06-28